# Patient Record
Sex: FEMALE | Race: OTHER | HISPANIC OR LATINO | Employment: UNEMPLOYED | URBAN - METROPOLITAN AREA
[De-identification: names, ages, dates, MRNs, and addresses within clinical notes are randomized per-mention and may not be internally consistent; named-entity substitution may affect disease eponyms.]

---

## 2020-10-07 ENCOUNTER — OFFICE VISIT (OUTPATIENT)
Dept: NEPHROLOGY | Facility: CLINIC | Age: 45
End: 2020-10-07

## 2020-10-07 VITALS
SYSTOLIC BLOOD PRESSURE: 142 MMHG | HEIGHT: 62 IN | BODY MASS INDEX: 20.21 KG/M2 | HEART RATE: 100 BPM | DIASTOLIC BLOOD PRESSURE: 90 MMHG | WEIGHT: 109.81 LBS

## 2020-10-07 DIAGNOSIS — I10 BENIGN ESSENTIAL HTN: ICD-10-CM

## 2020-10-07 DIAGNOSIS — N18.32 CHRONIC KIDNEY DISEASE (CKD) STAGE G3B/A3, MODERATELY DECREASED GLOMERULAR FILTRATION RATE (GFR) BETWEEN 30-44 ML/MIN/1.73 SQUARE METER AND ALBUMINURIA CREATININE RATIO GREATER THAN 300 MG/G: Primary | ICD-10-CM

## 2020-10-07 DIAGNOSIS — N04.9 NEPHROTIC SYNDROME: ICD-10-CM

## 2020-10-07 PROCEDURE — 99204 PR OFFICE/OUTPT VISIT, NEW, LEVL IV, 45-59 MIN: ICD-10-PCS | Mod: 25,S$PBB,, | Performed by: INTERNAL MEDICINE

## 2020-10-07 PROCEDURE — 76775 US EXAM ABDO BACK WALL LIM: CPT | Mod: PBBFAC | Performed by: INTERNAL MEDICINE

## 2020-10-07 PROCEDURE — 99203 OFFICE O/P NEW LOW 30 MIN: CPT | Mod: PBBFAC,25 | Performed by: INTERNAL MEDICINE

## 2020-10-07 PROCEDURE — 76775 PR  US, RETROPERITNL ABD,  LTD: ICD-10-PCS | Mod: 26,S$PBB,, | Performed by: INTERNAL MEDICINE

## 2020-10-07 PROCEDURE — 99999 PR PBB SHADOW E&M-NEW PATIENT-LVL III: ICD-10-PCS | Mod: PBBFAC,,, | Performed by: INTERNAL MEDICINE

## 2020-10-07 PROCEDURE — 99204 OFFICE O/P NEW MOD 45 MIN: CPT | Mod: 25,S$PBB,, | Performed by: INTERNAL MEDICINE

## 2020-10-07 PROCEDURE — 99999 PR PBB SHADOW E&M-NEW PATIENT-LVL III: CPT | Mod: PBBFAC,,, | Performed by: INTERNAL MEDICINE

## 2020-10-07 PROCEDURE — 76775 US EXAM ABDO BACK WALL LIM: CPT | Mod: 26,S$PBB,, | Performed by: INTERNAL MEDICINE

## 2020-10-07 RX ORDER — AMLODIPINE BESYLATE 10 MG/1
10 TABLET ORAL DAILY
COMMUNITY
End: 2021-05-27 | Stop reason: SDUPTHER

## 2020-10-07 RX ORDER — LOVASTATIN 40 MG/1
40 TABLET ORAL NIGHTLY
COMMUNITY
End: 2021-03-23 | Stop reason: SDUPTHER

## 2020-10-07 NOTE — H&P (VIEW-ONLY)
Subjective:       Patient ID: Luisa Baca is a 44 y.o. Other female who presents for new evaluation of Chronic Kidney Disease, Glomerulonephritis, Hypertension, and Proteinuria    HPI    Patient is a Lao-speaking female with history of hypertension for 5 years has been controlled on amlodipine only.  Has had history of proteinuria and worsening creatinine.  Now the creatinine is 1.45 with GFR-40%.  Patient also has history of elevated cholesterol along with foamy urine.  Possible nephrotic syndrome.  Has no insurance.  Has tried [facility] system.    October 2020 bedside ultrasound negative.  Proceed with a kidney biopsy.    Review of Systems   Constitutional: Negative for activity change, appetite change, chills, diaphoresis, fatigue, fever and unexpected weight change.   HENT: Negative for congestion, dental problem, drooling, postnasal drip, rhinorrhea and voice change.    Eyes: Negative for discharge.   Respiratory: Negative for apnea, cough, choking, chest tightness, shortness of breath, wheezing and stridor.    Cardiovascular: Negative for chest pain, palpitations and leg swelling.   Gastrointestinal: Negative for abdominal distention, blood in stool, constipation, diarrhea, nausea, rectal pain and vomiting.   Endocrine: Negative for cold intolerance, heat intolerance, polydipsia and polyuria.   Genitourinary: Negative for decreased urine volume, difficulty urinating, dysuria, enuresis, flank pain, frequency, hematuria and urgency.   Musculoskeletal: Negative for arthralgias, back pain, gait problem and joint swelling.   Skin: Negative for rash.   Allergic/Immunologic: Negative for food allergies and immunocompromised state.   Neurological: Negative for dizziness, tremors, syncope, numbness and headaches.   Hematological: Does not bruise/bleed easily.   Psychiatric/Behavioral: Negative for agitation, behavioral problems and self-injury. The patient is not nervous/anxious and is not hyperactive.    All other systems  "reviewed and are negative.      Objective:   BP (!) 142/90   Pulse 100   Ht 5' 2" (1.575 m)   Wt 49.8 kg (109 lb 12.6 oz)   BMI 20.08 kg/m²      Physical Exam  Constitutional:       Appearance: She is well-developed.   HENT:      Head: Normocephalic and atraumatic.   Eyes:      Conjunctiva/sclera: Conjunctivae normal.      Pupils: Pupils are equal, round, and reactive to light.   Neck:      Musculoskeletal: Full passive range of motion without pain, normal range of motion and neck supple. No edema.      Thyroid: No thyroid mass or thyromegaly.      Vascular: No carotid bruit.   Cardiovascular:      Rate and Rhythm: Normal rate and regular rhythm.      Chest Wall: PMI is not displaced.      Pulses: Normal pulses.      Heart sounds: Normal heart sounds, S1 normal and S2 normal. No murmur. No friction rub.   Pulmonary:      Effort: Pulmonary effort is normal. No accessory muscle usage or respiratory distress.      Breath sounds: Normal breath sounds. No wheezing or rales.   Chest:      Chest wall: No tenderness.   Abdominal:      General: Bowel sounds are normal. There is no distension.      Palpations: Abdomen is soft. There is no mass.      Tenderness: There is no abdominal tenderness. There is no rebound.      Hernia: No hernia is present.   Musculoskeletal: Normal range of motion.         General: No tenderness.   Skin:     General: Skin is warm and dry.      Coloration: Skin is not pale.      Findings: No bruising, ecchymosis, erythema or rash.      Nails: There is no clubbing.     Neurological:      Mental Status: She is alert and oriented to person, place, and time.      Cranial Nerves: No cranial nerve deficit.      Sensory: No sensory deficit.      Motor: No abnormal muscle tone.      Coordination: Coordination normal.      Deep Tendon Reflexes: Reflexes are normal and symmetric. Reflexes normal.   Psychiatric:         Speech: Speech normal.         Behavior: Behavior normal.         Thought Content: " Thought content normal.         Judgment: Judgment normal.                       The bilateral kidneys are small in size, echotexture, and cortical thickness. No evidence of hydronephrosis, solid mass, or calculus. No perinephric fluid collections.    The right kidney measures 9 cm in length.    The left kidney measures 8.7cm in length.       Impression: Normal renal ultrasound without evidence of hydronephrosis    Assessment:    )    1. Chronic kidney disease (CKD) stage G3b/A3, moderately decreased glomerular filtration rate (GFR) between 30-44 mL/min/1.73 square meter and albuminuria creatinine ratio greater than 300 mg/g    2. Nephrotic syndrome    3. Benign essential HTN        Plan:         proceed to diagnostic kidney biopsy

## 2020-10-07 NOTE — PROGRESS NOTES
Subjective:       Patient ID: Luisa Baca is a 44 y.o. Other female who presents for new evaluation of Chronic Kidney Disease, Glomerulonephritis, Hypertension, and Proteinuria    HPI    Patient is a Divehi-speaking female with history of hypertension for 5 years has been controlled on amlodipine only.  Has had history of proteinuria and worsening creatinine.  Now the creatinine is 1.45 with GFR-40%.  Patient also has history of elevated cholesterol along with foamy urine.  Possible nephrotic syndrome.  Has no insurance.  Has tried [facility].    October 2020 bedside ultrasound negative.  Proceed with a kidney biopsy.    Review of Systems   Constitutional: Negative for activity change, appetite change, chills, diaphoresis, fatigue, fever and unexpected weight change.   HENT: Negative for congestion, dental problem, drooling, postnasal drip, rhinorrhea and voice change.    Eyes: Negative for discharge.   Respiratory: Negative for apnea, cough, choking, chest tightness, shortness of breath, wheezing and stridor.    Cardiovascular: Negative for chest pain, palpitations and leg swelling.   Gastrointestinal: Negative for abdominal distention, blood in stool, constipation, diarrhea, nausea, rectal pain and vomiting.   Endocrine: Negative for cold intolerance, heat intolerance, polydipsia and polyuria.   Genitourinary: Negative for decreased urine volume, difficulty urinating, dysuria, enuresis, flank pain, frequency, hematuria and urgency.   Musculoskeletal: Negative for arthralgias, back pain, gait problem and joint swelling.   Skin: Negative for rash.   Allergic/Immunologic: Negative for food allergies and immunocompromised state.   Neurological: Negative for dizziness, tremors, syncope, numbness and headaches.   Hematological: Does not bruise/bleed easily.   Psychiatric/Behavioral: Negative for agitation, behavioral problems and self-injury. The patient is not nervous/anxious and is not hyperactive.    All other systems  "reviewed and are negative.      Objective:   BP (!) 142/90   Pulse 100   Ht 5' 2" (1.575 m)   Wt 49.8 kg (109 lb 12.6 oz)   BMI 20.08 kg/m²      Physical Exam  Constitutional:       Appearance: She is well-developed.   HENT:      Head: Normocephalic and atraumatic.   Eyes:      Conjunctiva/sclera: Conjunctivae normal.      Pupils: Pupils are equal, round, and reactive to light.   Neck:      Musculoskeletal: Full passive range of motion without pain, normal range of motion and neck supple. No edema.      Thyroid: No thyroid mass or thyromegaly.      Vascular: No carotid bruit.   Cardiovascular:      Rate and Rhythm: Normal rate and regular rhythm.      Chest Wall: PMI is not displaced.      Pulses: Normal pulses.      Heart sounds: Normal heart sounds, S1 normal and S2 normal. No murmur. No friction rub.   Pulmonary:      Effort: Pulmonary effort is normal. No accessory muscle usage or respiratory distress.      Breath sounds: Normal breath sounds. No wheezing or rales.   Chest:      Chest wall: No tenderness.   Abdominal:      General: Bowel sounds are normal. There is no distension.      Palpations: Abdomen is soft. There is no mass.      Tenderness: There is no abdominal tenderness. There is no rebound.      Hernia: No hernia is present.   Musculoskeletal: Normal range of motion.         General: No tenderness.   Skin:     General: Skin is warm and dry.      Coloration: Skin is not pale.      Findings: No bruising, ecchymosis, erythema or rash.      Nails: There is no clubbing.     Neurological:      Mental Status: She is alert and oriented to person, place, and time.      Cranial Nerves: No cranial nerve deficit.      Sensory: No sensory deficit.      Motor: No abnormal muscle tone.      Coordination: Coordination normal.      Deep Tendon Reflexes: Reflexes are normal and symmetric. Reflexes normal.   Psychiatric:         Speech: Speech normal.         Behavior: Behavior normal.         Thought Content: " Thought content normal.         Judgment: Judgment normal.                       The bilateral kidneys are small in size, echotexture, and cortical thickness. No evidence of hydronephrosis, solid mass, or calculus. No perinephric fluid collections.    The right kidney measures 9 cm in length.    The left kidney measures 8.7cm in length.       Impression: Normal renal ultrasound without evidence of hydronephrosis    Assessment:    )    1. Chronic kidney disease (CKD) stage G3b/A3, moderately decreased glomerular filtration rate (GFR) between 30-44 mL/min/1.73 square meter and albuminuria creatinine ratio greater than 300 mg/g    2. Nephrotic syndrome    3. Benign essential HTN        Plan:         proceed to diagnostic kidney biopsy

## 2020-10-21 DIAGNOSIS — R79.1 ABNORMAL BLOOD COAGULATION PROFILE: Primary | ICD-10-CM

## 2020-10-21 DIAGNOSIS — I10 ESSENTIAL HYPERTENSION: ICD-10-CM

## 2020-10-22 ENCOUNTER — TELEPHONE (OUTPATIENT)
Dept: RADIOLOGY | Facility: HOSPITAL | Age: 45
End: 2020-10-22

## 2020-10-23 ENCOUNTER — HOSPITAL ENCOUNTER (OUTPATIENT)
Dept: RADIOLOGY | Facility: HOSPITAL | Age: 45
Discharge: HOME | End: 2020-10-23
Attending: INTERNAL MEDICINE

## 2020-10-23 DIAGNOSIS — N04.9 NEPHROTIC SYNDROME: ICD-10-CM

## 2020-10-23 NOTE — PLAN OF CARE
Upon arrival pt states that she ate breakfast and her BP was 189/93. After consulting with Dr. Ford, pt was rescheduled to our earliest availability on 11/04. Pre-procedure instructions given to pt at this time and verbalized understanding of instructions and no questions at this time. Pt ambulated out with friend.

## 2020-10-29 ENCOUNTER — LAB VISIT (OUTPATIENT)
Dept: LAB | Facility: HOSPITAL | Age: 45
End: 2020-10-29
Attending: INTERNAL MEDICINE

## 2020-10-29 DIAGNOSIS — N18.32 CHRONIC KIDNEY DISEASE (CKD) STAGE G3B/A3, MODERATELY DECREASED GLOMERULAR FILTRATION RATE (GFR) BETWEEN 30-44 ML/MIN/1.73 SQUARE METER AND ALBUMINURIA CREATININE RATIO GREATER THAN 300 MG/G: ICD-10-CM

## 2020-10-29 DIAGNOSIS — I10 BENIGN ESSENTIAL HTN: ICD-10-CM

## 2020-10-29 DIAGNOSIS — N04.9 NEPHROTIC SYNDROME: ICD-10-CM

## 2020-10-29 LAB
ALBUMIN SERPL BCP-MCNC: 4.4 G/DL (ref 3.5–5.2)
ANION GAP SERPL CALC-SCNC: 9 MMOL/L (ref 8–16)
BASOPHILS # BLD AUTO: 0.05 K/UL (ref 0–0.2)
BASOPHILS NFR BLD: 0.9 % (ref 0–1.9)
BUN SERPL-MCNC: 50 MG/DL (ref 6–20)
CALCIUM SERPL-MCNC: 10.2 MG/DL (ref 8.7–10.5)
CHLORIDE SERPL-SCNC: 104 MMOL/L (ref 95–110)
CO2 SERPL-SCNC: 28 MMOL/L (ref 23–29)
CREAT SERPL-MCNC: 1.8 MG/DL (ref 0.5–1.4)
DIFFERENTIAL METHOD: NORMAL
EOSINOPHIL # BLD AUTO: 0.2 K/UL (ref 0–0.5)
EOSINOPHIL NFR BLD: 4.1 % (ref 0–8)
ERYTHROCYTE [DISTWIDTH] IN BLOOD BY AUTOMATED COUNT: 12.5 % (ref 11.5–14.5)
EST. GFR  (AFRICAN AMERICAN): 38 ML/MIN/1.73 M^2
EST. GFR  (NON AFRICAN AMERICAN): 33 ML/MIN/1.73 M^2
GLUCOSE SERPL-MCNC: 110 MG/DL (ref 70–110)
HCT VFR BLD AUTO: 42.7 % (ref 37–48.5)
HGB BLD-MCNC: 14.2 G/DL (ref 12–16)
IMM GRANULOCYTES # BLD AUTO: 0.02 K/UL (ref 0–0.04)
IMM GRANULOCYTES NFR BLD AUTO: 0.4 % (ref 0–0.5)
LYMPHOCYTES # BLD AUTO: 1.7 K/UL (ref 1–4.8)
LYMPHOCYTES NFR BLD: 30.1 % (ref 18–48)
MCH RBC QN AUTO: 28.7 PG (ref 27–31)
MCHC RBC AUTO-ENTMCNC: 33.3 G/DL (ref 32–36)
MCV RBC AUTO: 86 FL (ref 82–98)
MONOCYTES # BLD AUTO: 0.3 K/UL (ref 0.3–1)
MONOCYTES NFR BLD: 4.9 % (ref 4–15)
NEUTROPHILS # BLD AUTO: 3.3 K/UL (ref 1.8–7.7)
NEUTROPHILS NFR BLD: 59.6 % (ref 38–73)
NRBC BLD-RTO: 0 /100 WBC
PHOSPHATE SERPL-MCNC: 4.6 MG/DL (ref 2.7–4.5)
PLATELET # BLD AUTO: 231 K/UL (ref 150–350)
PMV BLD AUTO: 10.6 FL (ref 9.2–12.9)
POTASSIUM SERPL-SCNC: 4.4 MMOL/L (ref 3.5–5.1)
RBC # BLD AUTO: 4.94 M/UL (ref 4–5.4)
SODIUM SERPL-SCNC: 141 MMOL/L (ref 136–145)
WBC # BLD AUTO: 5.55 K/UL (ref 3.9–12.7)

## 2020-10-29 PROCEDURE — 36415 COLL VENOUS BLD VENIPUNCTURE: CPT

## 2020-10-29 PROCEDURE — 85025 COMPLETE CBC W/AUTO DIFF WBC: CPT

## 2020-10-29 PROCEDURE — 82610 CYSTATIN C: CPT

## 2020-10-29 PROCEDURE — 80069 RENAL FUNCTION PANEL: CPT

## 2020-11-02 LAB
CYSTATIN C SERPL-MCNC: 2.06 MG/L (ref 0.62–1.07)
GFR/BSA.PRED SERPLBLD CYS-BASED-ARV: 29 ML/MIN/BSA

## 2020-11-03 ENCOUNTER — TELEPHONE (OUTPATIENT)
Dept: RADIOLOGY | Facility: HOSPITAL | Age: 45
End: 2020-11-03

## 2020-11-03 NOTE — TELEPHONE ENCOUNTER
Interventional Radiology:  Called patient and VM was full.  Called 2nd number, spoke with her  - reminded him they need to be here at 0800 at hospital off [address] and [address] for 0800, nothing to eat/drink after midnight tonight, must have a ride home, take morning meds with a small sip of water.  He denies blood thinner use.

## 2020-11-04 ENCOUNTER — HOSPITAL ENCOUNTER (OUTPATIENT)
Dept: RADIOLOGY | Facility: HOSPITAL | Age: 45
Discharge: HOME | End: 2020-11-04
Attending: INTERNAL MEDICINE

## 2020-11-04 ENCOUNTER — HOSPITAL ENCOUNTER (EMERGENCY)
Facility: HOSPITAL | Age: 45
Discharge: HOME | End: 2020-11-04
Attending: EMERGENCY MEDICINE

## 2020-11-04 ENCOUNTER — TELEPHONE (OUTPATIENT)
Dept: NEPHROLOGY | Facility: CLINIC | Age: 45
End: 2020-11-04

## 2020-11-04 VITALS
DIASTOLIC BLOOD PRESSURE: 57 MMHG | OXYGEN SATURATION: 99 % | BODY MASS INDEX: 18.71 KG/M2 | TEMPERATURE: 98 F | HEART RATE: 52 BPM | HEIGHT: 63 IN | SYSTOLIC BLOOD PRESSURE: 97 MMHG | RESPIRATION RATE: 18 BRPM | WEIGHT: 105.63 LBS

## 2020-11-04 VITALS
BODY MASS INDEX: 19.49 KG/M2 | DIASTOLIC BLOOD PRESSURE: 57 MMHG | RESPIRATION RATE: 16 BRPM | HEART RATE: 56 BPM | OXYGEN SATURATION: 99 % | WEIGHT: 110 LBS | HEIGHT: 63 IN | SYSTOLIC BLOOD PRESSURE: 114 MMHG

## 2020-11-04 DIAGNOSIS — G89.18 ACUTE POST-OPERATIVE PAIN: Primary | ICD-10-CM

## 2020-11-04 LAB
ALBUMIN SERPL BCP-MCNC: 4 G/DL (ref 3.5–5.2)
ALP SERPL-CCNC: 75 U/L (ref 55–135)
ALT SERPL W/O P-5'-P-CCNC: 14 U/L (ref 10–44)
ANION GAP SERPL CALC-SCNC: 7 MMOL/L (ref 8–16)
AST SERPL-CCNC: 19 U/L (ref 10–40)
BACTERIA #/AREA URNS HPF: ABNORMAL /HPF
BASOPHILS # BLD AUTO: 0.03 K/UL (ref 0–0.2)
BASOPHILS NFR BLD: 0.3 % (ref 0–1.9)
BILIRUB SERPL-MCNC: 0.5 MG/DL (ref 0.1–1)
BILIRUB UR QL STRIP: NEGATIVE
BUN SERPL-MCNC: 41 MG/DL (ref 6–20)
CALCIUM SERPL-MCNC: 9.7 MG/DL (ref 8.7–10.5)
CHLORIDE SERPL-SCNC: 105 MMOL/L (ref 95–110)
CLARITY UR: CLEAR
CO2 SERPL-SCNC: 30 MMOL/L (ref 23–29)
COLOR UR: YELLOW
CREAT SERPL-MCNC: 2.1 MG/DL (ref 0.5–1.4)
DIFFERENTIAL METHOD: ABNORMAL
EOSINOPHIL # BLD AUTO: 0.1 K/UL (ref 0–0.5)
EOSINOPHIL NFR BLD: 0.9 % (ref 0–8)
ERYTHROCYTE [DISTWIDTH] IN BLOOD BY AUTOMATED COUNT: 12.1 % (ref 11.5–14.5)
EST. GFR  (AFRICAN AMERICAN): 32 ML/MIN/1.73 M^2
EST. GFR  (NON AFRICAN AMERICAN): 27 ML/MIN/1.73 M^2
GLUCOSE SERPL-MCNC: 192 MG/DL (ref 70–110)
GLUCOSE UR QL STRIP: NEGATIVE
HCT VFR BLD AUTO: 38.5 % (ref 37–48.5)
HGB BLD-MCNC: 13.2 G/DL (ref 12–16)
HGB UR QL STRIP: ABNORMAL
HYALINE CASTS #/AREA URNS LPF: 2 /LPF
IMM GRANULOCYTES # BLD AUTO: 0.07 K/UL (ref 0–0.04)
IMM GRANULOCYTES NFR BLD AUTO: 0.7 % (ref 0–0.5)
KETONES UR QL STRIP: NEGATIVE
LEUKOCYTE ESTERASE UR QL STRIP: NEGATIVE
LIPASE SERPL-CCNC: 51 U/L (ref 4–60)
LYMPHOCYTES # BLD AUTO: 1.5 K/UL (ref 1–4.8)
LYMPHOCYTES NFR BLD: 14.3 % (ref 18–48)
MCH RBC QN AUTO: 29.3 PG (ref 27–31)
MCHC RBC AUTO-ENTMCNC: 34.3 G/DL (ref 32–36)
MCV RBC AUTO: 86 FL (ref 82–98)
MICROSCOPIC COMMENT: ABNORMAL
MONOCYTES # BLD AUTO: 0.6 K/UL (ref 0.3–1)
MONOCYTES NFR BLD: 5.8 % (ref 4–15)
NEUTROPHILS # BLD AUTO: 8.2 K/UL (ref 1.8–7.7)
NEUTROPHILS NFR BLD: 78 % (ref 38–73)
NITRITE UR QL STRIP: NEGATIVE
NRBC BLD-RTO: 0 /100 WBC
PH UR STRIP: 6 [PH] (ref 5–8)
PLATELET # BLD AUTO: 248 K/UL (ref 150–350)
PMV BLD AUTO: 10.3 FL (ref 9.2–12.9)
POTASSIUM SERPL-SCNC: 4.2 MMOL/L (ref 3.5–5.1)
PROT SERPL-MCNC: 7 G/DL (ref 6–8.4)
PROT UR QL STRIP: ABNORMAL
RBC # BLD AUTO: 4.5 M/UL (ref 4–5.4)
RBC #/AREA URNS HPF: 5 /HPF (ref 0–4)
SODIUM SERPL-SCNC: 142 MMOL/L (ref 136–145)
SP GR UR STRIP: 1.02 (ref 1–1.03)
URN SPEC COLLECT METH UR: ABNORMAL
UROBILINOGEN UR STRIP-ACNC: NEGATIVE EU/DL
WBC # BLD AUTO: 10.54 K/UL (ref 3.9–12.7)
WBC #/AREA URNS HPF: 1 /HPF (ref 0–5)

## 2020-11-04 PROCEDURE — 99284 EMERGENCY DEPT VISIT MOD MDM: CPT | Mod: 25

## 2020-11-04 PROCEDURE — 80053 COMPREHEN METABOLIC PANEL: CPT

## 2020-11-04 PROCEDURE — 81000 URINALYSIS NONAUTO W/SCOPE: CPT

## 2020-11-04 PROCEDURE — 96375 TX/PRO/DX INJ NEW DRUG ADDON: CPT

## 2020-11-04 PROCEDURE — 96374 THER/PROPH/DIAG INJ IV PUSH: CPT

## 2020-11-04 PROCEDURE — 63600175 PHARM REV CODE 636 W HCPCS: Performed by: PHYSICIAN ASSISTANT

## 2020-11-04 PROCEDURE — 63600175 PHARM REV CODE 636 W HCPCS: Performed by: EMERGENCY MEDICINE

## 2020-11-04 PROCEDURE — 77012 CT SCAN FOR NEEDLE BIOPSY: CPT | Mod: TC

## 2020-11-04 PROCEDURE — 83690 ASSAY OF LIPASE: CPT

## 2020-11-04 PROCEDURE — 85025 COMPLETE CBC W/AUTO DIFF WBC: CPT

## 2020-11-04 RX ORDER — MORPHINE SULFATE 4 MG/ML
4 INJECTION, SOLUTION INTRAMUSCULAR; INTRAVENOUS
Status: COMPLETED | OUTPATIENT
Start: 2020-11-04 | End: 2020-11-04

## 2020-11-04 RX ORDER — ONDANSETRON 2 MG/ML
4 INJECTION INTRAMUSCULAR; INTRAVENOUS
Status: COMPLETED | OUTPATIENT
Start: 2020-11-04 | End: 2020-11-04

## 2020-11-04 RX ORDER — MIDAZOLAM HYDROCHLORIDE 1 MG/ML
INJECTION INTRAMUSCULAR; INTRAVENOUS CODE/TRAUMA/SEDATION MEDICATION
Status: DISCONTINUED | OUTPATIENT
Start: 2020-11-04 | End: 2020-11-05 | Stop reason: HOSPADM

## 2020-11-04 RX ORDER — FENTANYL CITRATE 50 UG/ML
INJECTION, SOLUTION INTRAMUSCULAR; INTRAVENOUS CODE/TRAUMA/SEDATION MEDICATION
Status: DISCONTINUED | OUTPATIENT
Start: 2020-11-04 | End: 2020-11-05 | Stop reason: HOSPADM

## 2020-11-04 RX ORDER — TRAMADOL HYDROCHLORIDE 50 MG/1
50 TABLET ORAL EVERY 8 HOURS PRN
Qty: 12 TABLET | Refills: 0 | Status: SHIPPED | OUTPATIENT
Start: 2020-11-04 | End: 2020-11-13

## 2020-11-04 RX ADMIN — MORPHINE SULFATE 4 MG: 4 INJECTION, SOLUTION INTRAMUSCULAR; INTRAVENOUS at 05:11

## 2020-11-04 RX ADMIN — FENTANYL CITRATE 25 MCG: 50 INJECTION, SOLUTION INTRAMUSCULAR; INTRAVENOUS at 10:11

## 2020-11-04 RX ADMIN — MIDAZOLAM HYDROCHLORIDE 0.5 MG: 1 INJECTION, SOLUTION INTRAMUSCULAR; INTRAVENOUS at 10:11

## 2020-11-04 RX ADMIN — ONDANSETRON 4 MG: 2 INJECTION INTRAMUSCULAR; INTRAVENOUS at 05:11

## 2020-11-04 NOTE — ED PROVIDER NOTES
SCRIBE #1 NOTE: I, Laurence Wilson, am scribing for, and in the presence of, Osvaldo Wells Jr., MD. I have scribed the entire note.      History      Chief Complaint   Patient presents with    Flank Pain     Pt c/o of left flank pain and N//V after left kidney biopsy today.        Review of patient's allergies indicates:  No Known Allergies     HPI   HPI    11/4/2020, 5:04 PM   History obtained from the daughter and patient      History of Present Illness: Luisa Aguilar is a 45 y.o. female patient, with no known medical history, status 2 hours post left kidney biopsy, who presents to the Emergency Department for an evaluation of left flank pain which onset gradually today approximately 1.5 hours after completing a CT-guided left kidney biopsy . Symptoms are constant and moderate in severity. No mitigating or exacerbating factors reported. Associated sxs include an achy left lower quadrant abdominal pain. Patient denies any fever, chills, dysuria, hematuria, urinary frequency, n/v, and all other sxs at this time. No prior treatment reported. No further complaints or concerns at this time.         Arrival mode: Personal vehicle    PCP: Konstantin Martines MD       Past Medical History:  History reviewed. No pertinent family history.       Past Surgical History:  History reviewed. No pertinent past surgical history.         Family History:  History reviewed. No pertinent family history.       Social History:  Social History     Tobacco Use    Smoking status: Never Smoker   Substance and Sexual Activity    Alcohol use: Never     Frequency: Never    Drug use: Not on file    Sexual activity: Unknown       ROS   Review of Systems   Constitutional: Negative for chills and fever.   HENT: Negative for sore throat.    Respiratory: Negative for shortness of breath.    Cardiovascular: Negative for chest pain.   Gastrointestinal: Positive for abdominal pain (LLQ). Negative for nausea and vomiting.    Genitourinary: Positive for flank pain (left ). Negative for dysuria, frequency and hematuria.   Musculoskeletal: Negative for back pain.   Skin: Negative for rash.   Neurological: Negative for weakness.   Hematological: Does not bruise/bleed easily.   All other systems reviewed and are negative.      Physical Exam      Initial Vitals [11/04/20 1628]   BP Pulse Resp Temp SpO2   (!) 103/55 (!) 57 16 97.9 °F (36.6 °C) 98 %      MAP       --          Physical Exam  Nursing Notes and Vital Signs Reviewed.  Constitutional: Patient is in mild distress. Well-developed and well-nourished.  Head: Atraumatic. Normocephalic.  Eyes: EOM intact.  No scleral icterus.  ENT: Mucous membranes are moist.  Nares clear  Neck:. Full ROM.  Trachea midline  Cardiovascular: Regular rate. Regular rhythm. No murmurs, rubs, or gallops. Distal pulses are 2+ and symmetric.  Pulmonary/Chest: No respiratory distress. Clear to auscultation bilaterally. No wheezing or rales.  Abdominal: Soft and non-distended.  There is no tenderness.  No rebound, guarding, or rigidity. Good bowel sounds.  Genitourinary:  Mild left CVA tenderness.  No guarding or rebound.  No suprapubic tenderness.  Musculoskeletal: Moves all extremities. No obvious deformities. No edema. No calf tenderness.  Skin: There is a puncture wound to the left back that is healing consistent with her history today a CT-guided biopsy.. There is no redness, no swelling, no drainage. It is consistent with history.  No fluctuance or abscess.  Neurological:  Alert, awake, and appropriate.  Normal speech.  No acute focal neurological deficits are appreciated.  Two through 12 intact bilaterally.  Psychiatric: Normal affect. Good eye contact. Appropriate in content.    ED Course    Procedures  ED Vital Signs:  Vitals:    11/04/20 1628 11/04/20 1716 11/04/20 1725 11/04/20 1730   BP: (!) 103/55  127/63 (!) 106/56   Pulse: (!) 57 (!) 57 63 (!) 57   Resp: 16  19 17   Temp: 97.9 °F (36.6 °C)     "  TempSrc: Oral      SpO2: 98%  99% 100%   Weight: 47.9 kg (105 lb 9.6 oz)      Height: 5' 3" (1.6 m)       11/04/20 1735 11/04/20 1830   BP:  (!) 105/58   Pulse:  (!) 49   Resp: 16 17   Temp:     TempSrc:     SpO2:  100%   Weight:     Height:         Abnormal Lab Results:  Labs Reviewed   CBC W/ AUTO DIFFERENTIAL - Abnormal; Notable for the following components:       Result Value    Immature Granulocytes 0.7 (*)     Gran # (ANC) 8.2 (*)     Immature Grans (Abs) 0.07 (*)     Gran % 78.0 (*)     Lymph % 14.3 (*)     All other components within normal limits   COMPREHENSIVE METABOLIC PANEL - Abnormal; Notable for the following components:    CO2 30 (*)     Glucose 192 (*)     BUN 41 (*)     Creatinine 2.1 (*)     Anion Gap 7 (*)     eGFR if  32 (*)     eGFR if non  27 (*)     All other components within normal limits   URINALYSIS - Abnormal; Notable for the following components:    Protein, UA 2+ (*)     Occult Blood UA 2+ (*)     All other components within normal limits   URINALYSIS MICROSCOPIC - Abnormal; Notable for the following components:    RBC, UA 5 (*)     Hyaline Casts, UA 2 (*)     All other components within normal limits   LIPASE        All Lab Results:   Results for orders placed or performed during the hospital encounter of 11/04/20   CBC auto differential   Result Value Ref Range    WBC 10.54 3.90 - 12.70 K/uL    RBC 4.50 4.00 - 5.40 M/uL    Hemoglobin 13.2 12.0 - 16.0 g/dL    Hematocrit 38.5 37.0 - 48.5 %    MCV 86 82 - 98 fL    MCH 29.3 27.0 - 31.0 pg    MCHC 34.3 32.0 - 36.0 g/dL    RDW 12.1 11.5 - 14.5 %    Platelets 248 150 - 350 K/uL    MPV 10.3 9.2 - 12.9 fL    Immature Granulocytes 0.7 (H) 0.0 - 0.5 %    Gran # (ANC) 8.2 (H) 1.8 - 7.7 K/uL    Immature Grans (Abs) 0.07 (H) 0.00 - 0.04 K/uL    Lymph # 1.5 1.0 - 4.8 K/uL    Mono # 0.6 0.3 - 1.0 K/uL    Eos # 0.1 0.0 - 0.5 K/uL    Baso # 0.03 0.00 - 0.20 K/uL    nRBC 0 0 /100 WBC    Gran % 78.0 (H) 38.0 - 73.0 %    " Lymph % 14.3 (L) 18.0 - 48.0 %    Mono % 5.8 4.0 - 15.0 %    Eosinophil % 0.9 0.0 - 8.0 %    Basophil % 0.3 0.0 - 1.9 %    Differential Method Automated    Comprehensive metabolic panel   Result Value Ref Range    Sodium 142 136 - 145 mmol/L    Potassium 4.2 3.5 - 5.1 mmol/L    Chloride 105 95 - 110 mmol/L    CO2 30 (H) 23 - 29 mmol/L    Glucose 192 (H) 70 - 110 mg/dL    BUN 41 (H) 6 - 20 mg/dL    Creatinine 2.1 (H) 0.5 - 1.4 mg/dL    Calcium 9.7 8.7 - 10.5 mg/dL    Total Protein 7.0 6.0 - 8.4 g/dL    Albumin 4.0 3.5 - 5.2 g/dL    Total Bilirubin 0.5 0.1 - 1.0 mg/dL    Alkaline Phosphatase 75 55 - 135 U/L    AST 19 10 - 40 U/L    ALT 14 10 - 44 U/L    Anion Gap 7 (L) 8 - 16 mmol/L    eGFR if African American 32 (A) >60 mL/min/1.73 m^2    eGFR if non African American 27 (A) >60 mL/min/1.73 m^2   Lipase   Result Value Ref Range    Lipase 51 4 - 60 U/L   Urinalysis   Result Value Ref Range    Specimen UA Urine, Clean Catch     Color, UA Yellow Yellow, Straw, Natalia    Appearance, UA Clear Clear    pH, UA 6.0 5.0 - 8.0    Specific Gravity, UA 1.020 1.005 - 1.030    Protein, UA 2+ (A) Negative    Glucose, UA Negative Negative    Ketones, UA Negative Negative    Bilirubin (UA) Negative Negative    Occult Blood UA 2+ (A) Negative    Nitrite, UA Negative Negative    Urobilinogen, UA Negative <2.0 EU/dL    Leukocytes, UA Negative Negative   Urinalysis Microscopic   Result Value Ref Range    RBC, UA 5 (H) 0 - 4 /hpf    WBC, UA 1 0 - 5 /hpf    Bacteria Rare None-Occ /hpf    Hyaline Casts, UA 2 (A) 0-1/lpf /lpf    Microscopic Comment SEE COMMENT        Imaging Results:  Imaging Results    None                 The Emergency Provider reviewed the vital signs and test results, which are outlined above.    ED Discussion     7:41 PM: Reassessed pt at this time.  Pt states her condition has improved at this time. Discussed with pt all pertinent ED information and results. Discussed pt dx and plan of tx. Gave pt all f/u and return to  the ED instructions. All questions and concerns were addressed at this time. Pt expresses understanding of information and instructions, and is comfortable with plan to discharge. Pt is stable for discharge.    I discussed with patient and/or family/caretaker that evaluation in the ED does not suggest any emergent or life threatening medical conditions requiring immediate intervention beyond what was provided in the ED, and I believe patient is safe for discharge.  Regardless, an unremarkable evaluation in the ED does not preclude the development or presence of a serious of life threatening condition. As such, patient was instructed to return immediately for any worsening or change in current symptoms.    7:45 PM  Patient is stable nontoxic.  Symptoms appear to be related to postoperative pain from her biopsy.  There is no sign of infection the patient is stable nontoxic.  She is safe for discharge in my opinion.  Discussed with her all findings well as the plan of care.  She verbalized agreement understanding with all seems reliable.  She does have chronic renal insufficiency which was why she had a biopsy today.       ED Medication(s):  Medications   ondansetron injection 4 mg (4 mg Intravenous Given 11/4/20 1735)   morphine injection 4 mg (4 mg Intravenous Given 11/4/20 1735)     New Prescriptions    TRAMADOL (ULTRAM) 50 MG TABLET    Take 1 tablet (50 mg total) by mouth every 8 (eight) hours as needed.       Follow-up Information     Konstantin Martines MD.    Specialty: Family Medicine  Contact information:  [address] [facility] [id number] [phone]                     Medical Decision Making    Medical Decision Making:   Clinical Tests:   Lab Tests: Ordered and Reviewed           Scribe Attestation:   Scribe #1: I performed the above scribed service and the documentation accurately describes the services I performed. I attest to the accuracy of the note.    Attending:   Physician Attestation Statement  for Scribe #1: I, Osvaldo Wells Jr., MD, personally performed the services described in this documentation, as scribed by Laurence Wilson, in my presence, and it is both accurate and complete.          Clinical Impression       ICD-10-CM ICD-9-CM   1. Acute post-operative pain  G89.18 338.18       Disposition:   Disposition: Discharged  Condition: Stable         Osvaldo Wells Jr., MD  11/04/20 1946

## 2020-11-04 NOTE — PLAN OF CARE
Band aid to left lateral flank puncture site C/D/I with no bleeding/redness/swelling noted. VSS, NADN, and pt meets criteria for discharge. Discharge instructions given to and reviewed with pt and pt's daughter and both verbalized understanding of all. Pt discharged to home, taken out via wheelchair and driven home by daughter.

## 2020-11-04 NOTE — DISCHARGE SUMMARY
Pre Op Diagnosis: Nephrotic syndrome     Post Op Diagnosis: same     Procedure:  Renal core biopsy     Procedure performed by: Liliana ALMARAZ, Prateek COX     Written Informed Consent Obtained: Yes     Specimen Removed:  yes     Estimated Blood Loss:  minimal     Findings: Local anesthesia and moderate sedation were used.     The patient tolerated the procedure well and there were no complications.      Sterile technique was performed in the left flank, lidocaine was used as a local anesthetic.  Multiple samples taken from the left renal cortex.  Pt tolerated the procedure well without immediate complications.  Please see radiologist report for details. F/u with PCP and/or ordering physician.

## 2020-11-04 NOTE — SEDATION DOCUMENTATION
Pt placed prone with arms above her head on CT table at this time. CM in place VSS, NADN, and pt verbalizes understanding of procedure.

## 2020-11-04 NOTE — TELEPHONE ENCOUNTER
----- Message from Janice Lindsey sent at 11/4/2020  3:48 PM CST -----  Contact: Ancelmo-spouse  Ancelmo requesting a call back regardin gpt. He states that the pt is in a lot of pain after having biopsy. Please call Ancelmo back at [phone]

## 2020-11-04 NOTE — SEDATION DOCUMENTATION
Procedure completed at this time. VSS, NADN, and pt tolerated procedure well. Band aid placed to left lateral flank area puncture site C/D/I with no bleeding noted. Will continue to monitor pt.

## 2020-11-04 NOTE — DISCHARGE INSTRUCTIONS
Recuperación después de la sedación para procedimientos (adultos)  Le administraron medicamentos por vía intravenosa para sedarlo karla la cirugía. Es posible que le hayan dado un medicamento contra el dolor y otro para dormir. La mayor parte del efecto de estos medicamentos ya ha desaparecido. Marcos puede sentir somnolencia karla las próximas seis a ocho horas.   Cuidados en el hogar  Cuando llegue a doe casa, siga las indicaciones detalladas a continuación:  · Es importante que haya un adulto responsable a doe lado karla las próximas ocho horas para vigilar que doe estado no empeore.  · No christoph alcohol karla las próximas 24 horas.  · No conduzca, no opere maquinaria peligrosa ni tome decisiones importantes personales o de negocios karla las siguientes 24 horas.  · Para evitar lesiones o caídas, y karla al menos 24 horas después del procedimiento, tenga cuidado al ponerse de pie y caminar.  Nota: Doe proveedor de atención médica puede indicarle que no tome ningún medicamento por boca para el dolor o para dormir karla las próximas cuatro horas. Dichos medicamentos pueden reaccionar con los medicamentos que le administraron en el hospital. Podrían causar karen respuesta mucho más suha que lo normal.   Seguimiento  Programe karen visita de seguimiento con doe proveedor de atención médica si no se siente alerta ni puede reanudar doe nivel normal de actividad en las siguientes 12 horas.   Cuándo buscar atención médica  Llame a doe proveedor de atención médica de inmediato si tiene cualquiera de los siguientes síntomas:   · somnolencia que empeora;  · debilidad o mareos que empeoran;  · vómitos persistentes;  · imposibilidad para despertarse;  · fiebre;  · erupción cutánea nueva.  © 2670-0483 [employer]. [address] [place] [address], [place] [address]. Todos los derechos reservados. Esta información no pretende sustituir la atención médica profesional. Sólo doe médico puede diagnosticar y tratar un problema  de cody.           Instrucciones de maria eugenia para la biopsia renal  A usted se le practicó karen biopsia renal. Doe médico empleó karen aguja especial para kianna karen pequeña muestra de tejido del riñón a fin de examinarla y determinar si existen síntomas de daño y enfermedad. Se solicita karen biopsia renal después de que otras pruebas hayan mostrado la posibilidad de algún problema con el riñón. Las biopsias renales se practican cuando se sospecha karen enfermedad renal y para descartar la posibilidad de cáncer.  Cuidados en la casa  · Descanse karla 24 a 48 horas. Levántese solo para ir al baño.  · No conduzca un automóvil karla por 24 a 48 horas después del procedimiento.  · No se duche karla 24 horas después de la biopsia. Si lo desea, puede lavarse con esponja o toallita. Cuando pueda ducharse, no se frote el sitio de la biopsia. Lávese cuidadosamente el área y séquela con palmaditas suaves.  · Quite el vendaje que cubre el sitio de la biopsia entre 24 y 48 horas después del procedimiento.  · No levante nada que pese más de 10 libras karla 3 o 4 días después del procedimiento.  · Pregúntele a doe médico cuándo puede volver a trabajar. Deberá informar a doe médico si doe trabajo implica levantar cosas pesadas.  · Si normalmente aayush medicamentos diluyentes de la juan antonio (anticoagulantes o antiagregantes plaquetarios) y dejó de tomarlos unos días antes de doe procedimiento, pregúntele a doe médico cuándo debe comenzar a tomarlos otra vez.        Cuándo debe llamar a doe médico  Llame a doe médico de inmediato si nota que tiene cualquiera de estos síntomas:  · Juan Antonio en la orina  · Agotamiento o mucha debilidad  · Mareo o aturdimiento  · Dificultad repentina (o en aumento) para respirar  · Dolor repentino en el pecho  · Fiebre de 100.4 °F (38  °C) o más maria eugenia, o escalofríos  · Aumento del enrojecimiento, sensibilidad al tacto o hinchazón en el sitio de la biopsia  · Abertura o supuración en el sitio de la biopsia  · Aumento  del dolor (con o sin actividad)   Date Last Reviewed: 1/6/2015  © 5884-6313 [employer]. [address] [place] [address], [place] [address]. Todos los derechos reservados. Esta información no pretende sustituir la atención médica profesional. Sólo doe médico puede diagnosticar y tratar un problema de cody.

## 2020-11-05 ENCOUNTER — HOSPITAL ENCOUNTER (EMERGENCY)
Facility: HOSPITAL | Age: 45
Discharge: HOME | End: 2020-11-05
Attending: EMERGENCY MEDICINE

## 2020-11-05 VITALS
HEART RATE: 68 BPM | SYSTOLIC BLOOD PRESSURE: 131 MMHG | DIASTOLIC BLOOD PRESSURE: 66 MMHG | OXYGEN SATURATION: 100 % | BODY MASS INDEX: 18.43 KG/M2 | TEMPERATURE: 99 F | HEIGHT: 63 IN | WEIGHT: 104 LBS | RESPIRATION RATE: 19 BRPM

## 2020-11-05 DIAGNOSIS — R11.10 VOMITING: Primary | ICD-10-CM

## 2020-11-05 DIAGNOSIS — S37.012A HEMATOMA OF LEFT KIDNEY, INITIAL ENCOUNTER: ICD-10-CM

## 2020-11-05 LAB
ALBUMIN SERPL BCP-MCNC: 4.2 G/DL (ref 3.5–5.2)
ALP SERPL-CCNC: 80 U/L (ref 55–135)
ALT SERPL W/O P-5'-P-CCNC: 15 U/L (ref 10–44)
ANION GAP SERPL CALC-SCNC: 11 MMOL/L (ref 8–16)
AST SERPL-CCNC: 19 U/L (ref 10–40)
BACTERIA #/AREA URNS HPF: ABNORMAL /HPF
BASOPHILS # BLD AUTO: 0.03 K/UL (ref 0–0.2)
BASOPHILS NFR BLD: 0.3 % (ref 0–1.9)
BILIRUB SERPL-MCNC: 0.7 MG/DL (ref 0.1–1)
BILIRUB UR QL STRIP: NEGATIVE
BUN SERPL-MCNC: 41 MG/DL (ref 6–20)
CALCIUM SERPL-MCNC: 10.1 MG/DL (ref 8.7–10.5)
CHLORIDE SERPL-SCNC: 99 MMOL/L (ref 95–110)
CLARITY UR: CLEAR
CO2 SERPL-SCNC: 25 MMOL/L (ref 23–29)
COLOR UR: YELLOW
CREAT SERPL-MCNC: 2.6 MG/DL (ref 0.5–1.4)
DIFFERENTIAL METHOD: ABNORMAL
EOSINOPHIL # BLD AUTO: 0 K/UL (ref 0–0.5)
EOSINOPHIL NFR BLD: 0.1 % (ref 0–8)
ERYTHROCYTE [DISTWIDTH] IN BLOOD BY AUTOMATED COUNT: 12 % (ref 11.5–14.5)
EST. GFR  (AFRICAN AMERICAN): 24 ML/MIN/1.73 M^2
EST. GFR  (NON AFRICAN AMERICAN): 21 ML/MIN/1.73 M^2
GLUCOSE SERPL-MCNC: 123 MG/DL (ref 70–110)
GLUCOSE UR QL STRIP: NEGATIVE
HCT VFR BLD AUTO: 37 % (ref 37–48.5)
HGB BLD-MCNC: 12.5 G/DL (ref 12–16)
HGB UR QL STRIP: ABNORMAL
HYALINE CASTS #/AREA URNS LPF: 2 /LPF
IMM GRANULOCYTES # BLD AUTO: 0.05 K/UL (ref 0–0.04)
IMM GRANULOCYTES NFR BLD AUTO: 0.5 % (ref 0–0.5)
KETONES UR QL STRIP: NEGATIVE
LEUKOCYTE ESTERASE UR QL STRIP: ABNORMAL
LIPASE SERPL-CCNC: 22 U/L (ref 4–60)
LYMPHOCYTES # BLD AUTO: 0.8 K/UL (ref 1–4.8)
LYMPHOCYTES NFR BLD: 7.5 % (ref 18–48)
MCH RBC QN AUTO: 28.8 PG (ref 27–31)
MCHC RBC AUTO-ENTMCNC: 33.8 G/DL (ref 32–36)
MCV RBC AUTO: 85 FL (ref 82–98)
MICROSCOPIC COMMENT: ABNORMAL
MONOCYTES # BLD AUTO: 0.3 K/UL (ref 0.3–1)
MONOCYTES NFR BLD: 3.1 % (ref 4–15)
NEUTROPHILS # BLD AUTO: 9.8 K/UL (ref 1.8–7.7)
NEUTROPHILS NFR BLD: 88.5 % (ref 38–73)
NITRITE UR QL STRIP: NEGATIVE
NRBC BLD-RTO: 0 /100 WBC
PH UR STRIP: 6 [PH] (ref 5–8)
PLATELET # BLD AUTO: 218 K/UL (ref 150–350)
PMV BLD AUTO: 9.9 FL (ref 9.2–12.9)
POTASSIUM SERPL-SCNC: 4.5 MMOL/L (ref 3.5–5.1)
PROT SERPL-MCNC: 7.5 G/DL (ref 6–8.4)
PROT UR QL STRIP: ABNORMAL
RBC # BLD AUTO: 4.34 M/UL (ref 4–5.4)
RBC #/AREA URNS HPF: 5 /HPF (ref 0–4)
SODIUM SERPL-SCNC: 135 MMOL/L (ref 136–145)
SP GR UR STRIP: 1.02 (ref 1–1.03)
SQUAMOUS #/AREA URNS HPF: 2 /HPF
URN SPEC COLLECT METH UR: ABNORMAL
UROBILINOGEN UR STRIP-ACNC: NEGATIVE EU/DL
WBC # BLD AUTO: 11 K/UL (ref 3.9–12.7)
WBC #/AREA URNS HPF: 2 /HPF (ref 0–5)

## 2020-11-05 PROCEDURE — 81000 URINALYSIS NONAUTO W/SCOPE: CPT

## 2020-11-05 PROCEDURE — 99284 EMERGENCY DEPT VISIT MOD MDM: CPT | Mod: 25

## 2020-11-05 PROCEDURE — 83690 ASSAY OF LIPASE: CPT

## 2020-11-05 PROCEDURE — 96361 HYDRATE IV INFUSION ADD-ON: CPT

## 2020-11-05 PROCEDURE — 85025 COMPLETE CBC W/AUTO DIFF WBC: CPT

## 2020-11-05 PROCEDURE — 36415 COLL VENOUS BLD VENIPUNCTURE: CPT

## 2020-11-05 PROCEDURE — 63600175 PHARM REV CODE 636 W HCPCS: Performed by: EMERGENCY MEDICINE

## 2020-11-05 PROCEDURE — 80053 COMPREHEN METABOLIC PANEL: CPT

## 2020-11-05 PROCEDURE — 25000003 PHARM REV CODE 250: Performed by: EMERGENCY MEDICINE

## 2020-11-05 PROCEDURE — 96365 THER/PROPH/DIAG IV INF INIT: CPT

## 2020-11-05 RX ORDER — ONDANSETRON 4 MG/1
4 TABLET, FILM COATED ORAL EVERY 8 HOURS PRN
Qty: 12 TABLET | Refills: 0 | Status: SHIPPED | OUTPATIENT
Start: 2020-11-05 | End: 2021-09-08

## 2020-11-05 RX ADMIN — SODIUM CHLORIDE 1000 ML: 0.9 INJECTION, SOLUTION INTRAVENOUS at 07:11

## 2020-11-05 RX ADMIN — PROMETHAZINE HYDROCHLORIDE 25 MG: 25 INJECTION INTRAMUSCULAR; INTRAVENOUS at 07:11

## 2020-11-06 ENCOUNTER — TELEPHONE (OUTPATIENT)
Dept: RADIOLOGY | Facility: HOSPITAL | Age: 45
End: 2020-11-06

## 2020-11-06 NOTE — TELEPHONE ENCOUNTER
Interventional Radiology:  Called patient and spoke with , she is not having pain, no N/V, night light headedness when standing and denies fast heart rate.  He states she feels much better.  I gave him our direct line to call if any of the symptoms develop or come back.

## 2020-11-06 NOTE — PROGRESS NOTES
Kidney Biopsy Preliminary Report:     D/w Dr. Kaur :      Small Sample:     IgA Nephropathy     10 % fibrosis     Mesangial expansion       Pollo Dennison MD

## 2020-11-06 NOTE — ED PROVIDER NOTES
SCRIBE #1 NOTE: I, Diane Fraire, am scribing for, and in the presence of, Carlie Schultz MD. I have scribed the HPI, ROS, and PEx.     SCRIBE #2 NOTE: I, Fartun Everett, am scribing for, and in the presence of,  Wilber Noriega MD. I have scribed the remaining portions of the note not scribed by Scribe #1.      History     Chief Complaint   Patient presents with    Vomiting     Vomiting - s/o yesterday s/p biopsy of L kidney. Was seen at this ED for same yesterday. Vomiting has continued and worsened today.     Review of patient's allergies indicates:  No Known Allergies      History of Present Illness     HPI    11/5/2020, 7:29 PM  History obtained through the patient's daughter for the patient as pt does not speak English.      History of Present Illness: Luisa Aguilar is a 45 y.o. female patient with a h/o HTN who presents to the Emergency Department for evaluation of emesis which onset last night around 1700. Pt started to vomit after having a biopsy of her L kidney yesterday. Pt presented to the ED yesterday for the same sxs as she presents to the ED with for today's visit. The pt was diagnosed with acute post-operative and discharged with tramadol. Symptoms are episodic and moderate in severity. No mitigating or exacerbating factors reported. Associated sxs include nausea and left flank pain secondary to CT-guided left kidney biopsy. Patient denies any fever, chills, abd pain, constipation, dysuria, difficulty urinating, and all other sxs at this time. No further complaints or concerns at this time.       Arrival mode: Personal transportation      PCP: Konstantin Martines MD      Past Medical History:  Past Medical History:   Diagnosis Date    Hypertension        Past Surgical History:  History reviewed. No pertinent surgical history.      Family History:  History reviewed. No pertinent family history.    Social History:   Social History     Tobacco Use    Smoking status: Never Smoker    Substance and Sexual Activity    Alcohol use: Never     Frequency: Never    Drug use: Never    Sexual activity: Unknown        Review of Systems     Review of Systems   Constitutional: Negative for chills and fever.   HENT: Negative for congestion and sore throat.    Respiratory: Negative for cough and shortness of breath.    Cardiovascular: Negative for chest pain.   Gastrointestinal: Positive for nausea and vomiting. Negative for abdominal pain, constipation and diarrhea.   Genitourinary: Positive for flank pain (L (secondary to CT-guided left kidney biopsy)). Negative for difficulty urinating and dysuria.   Musculoskeletal: Negative for back pain.   Skin: Negative for rash.   Neurological: Negative for weakness and headaches.   Hematological: Does not bruise/bleed easily.   All other systems reviewed and are negative.       Physical Exam     Initial Vitals [11/05/20 1715]   BP Pulse Resp Temp SpO2   129/65 70 16 98.5 °F (36.9 °C) 100 %      MAP       --          Physical Exam  Nursing Notes and Vital Signs Reviewed.  Constitutional: Well-developed and well-nourished.   Head: Atraumatic. Normocephalic.  Eyes: EOM intact. No scleral icterus.  ENT: Mucous membranes are moist. Oropharynx is clear and symmetric.    Neck: Supple. Full ROM. No lymphadenopathy.  Cardiovascular: Regular rate. Regular rhythm. No murmurs, rubs, or gallops. Distal pulses are 2+ and symmetric.  Pulmonary/Chest: No respiratory distress. Clear to auscultation bilaterally. No wheezing or rales.  Abdominal: Soft and non-distended.  There is no tenderness.  No rebound, guarding, or rigidity.  Genitourinary: No CVA tenderness  Musculoskeletal: Moves all extremities. No obvious deformities. No calf tenderness.  Skin: Warm and dry.  Neurological:  Alert, awake, and appropriate.  Normal speech.  No acute focal neurological deficits are appreciated.  Psychiatric: Normal affect. Good eye contact. Appropriate in content.     ED Course  "  Procedures  ED Vital Signs:  Vitals:    11/05/20 1715 11/05/20 1901 11/05/20 1917 11/05/20 2026   BP: 129/65  (!) 126/58    Pulse: 70 74 61    Resp: 16  15    Temp: 98.5 °F (36.9 °C)      TempSrc: Oral      SpO2: 100%  100%    Weight:    47.2 kg (104 lb)   Height: 5' 3" (1.6 m)       11/05/20 2233   BP: 131/66   Pulse: 68   Resp: 19   Temp:    TempSrc:    SpO2: 100%   Weight:    Height:        Abnormal Lab Results:  Labs Reviewed   CBC W/ AUTO DIFFERENTIAL - Abnormal; Notable for the following components:       Result Value    Gran # (ANC) 9.8 (*)     Immature Grans (Abs) 0.05 (*)     Lymph # 0.8 (*)     Gran % 88.5 (*)     Lymph % 7.5 (*)     Mono % 3.1 (*)     All other components within normal limits   COMPREHENSIVE METABOLIC PANEL - Abnormal; Notable for the following components:    Sodium 135 (*)     Glucose 123 (*)     BUN 41 (*)     Creatinine 2.6 (*)     eGFR if  24 (*)     eGFR if non  21 (*)     All other components within normal limits   URINALYSIS, REFLEX TO URINE CULTURE - Abnormal; Notable for the following components:    Protein, UA 1+ (*)     Occult Blood UA 2+ (*)     Leukocytes, UA Trace (*)     All other components within normal limits    Narrative:     Specimen Source->Urine   URINALYSIS MICROSCOPIC - Abnormal; Notable for the following components:    RBC, UA 5 (*)     Hyaline Casts, UA 2 (*)     All other components within normal limits    Narrative:     Specimen Source->Urine   LIPASE        All Lab Results:  Results for orders placed or performed during the hospital encounter of 11/05/20   CBC auto differential   Result Value Ref Range    WBC 11.00 3.90 - 12.70 K/uL    RBC 4.34 4.00 - 5.40 M/uL    Hemoglobin 12.5 12.0 - 16.0 g/dL    Hematocrit 37.0 37.0 - 48.5 %    MCV 85 82 - 98 fL    MCH 28.8 27.0 - 31.0 pg    MCHC 33.8 32.0 - 36.0 g/dL    RDW 12.0 11.5 - 14.5 %    Platelets 218 150 - 350 K/uL    MPV 9.9 9.2 - 12.9 fL    Immature Granulocytes 0.5 0.0 - 0.5 % "    Gran # (ANC) 9.8 (H) 1.8 - 7.7 K/uL    Immature Grans (Abs) 0.05 (H) 0.00 - 0.04 K/uL    Lymph # 0.8 (L) 1.0 - 4.8 K/uL    Mono # 0.3 0.3 - 1.0 K/uL    Eos # 0.0 0.0 - 0.5 K/uL    Baso # 0.03 0.00 - 0.20 K/uL    nRBC 0 0 /100 WBC    Gran % 88.5 (H) 38.0 - 73.0 %    Lymph % 7.5 (L) 18.0 - 48.0 %    Mono % 3.1 (L) 4.0 - 15.0 %    Eosinophil % 0.1 0.0 - 8.0 %    Basophil % 0.3 0.0 - 1.9 %    Differential Method Automated    Comprehensive metabolic panel   Result Value Ref Range    Sodium 135 (L) 136 - 145 mmol/L    Potassium 4.5 3.5 - 5.1 mmol/L    Chloride 99 95 - 110 mmol/L    CO2 25 23 - 29 mmol/L    Glucose 123 (H) 70 - 110 mg/dL    BUN 41 (H) 6 - 20 mg/dL    Creatinine 2.6 (H) 0.5 - 1.4 mg/dL    Calcium 10.1 8.7 - 10.5 mg/dL    Total Protein 7.5 6.0 - 8.4 g/dL    Albumin 4.2 3.5 - 5.2 g/dL    Total Bilirubin 0.7 0.1 - 1.0 mg/dL    Alkaline Phosphatase 80 55 - 135 U/L    AST 19 10 - 40 U/L    ALT 15 10 - 44 U/L    Anion Gap 11 8 - 16 mmol/L    eGFR if African American 24 (A) >60 mL/min/1.73 m^2    eGFR if non African American 21 (A) >60 mL/min/1.73 m^2   Lipase   Result Value Ref Range    Lipase 22 4 - 60 U/L   Urinalysis, Reflex to Urine Culture Urine, Clean Catch    Specimen: Urine   Result Value Ref Range    Specimen UA Urine, Clean Catch     Color, UA Yellow Yellow, Straw, Natalia    Appearance, UA Clear Clear    pH, UA 6.0 5.0 - 8.0    Specific Gravity, UA 1.020 1.005 - 1.030    Protein, UA 1+ (A) Negative    Glucose, UA Negative Negative    Ketones, UA Negative Negative    Bilirubin (UA) Negative Negative    Occult Blood UA 2+ (A) Negative    Nitrite, UA Negative Negative    Urobilinogen, UA Negative <2.0 EU/dL    Leukocytes, UA Trace (A) Negative   Urinalysis Microscopic   Result Value Ref Range    RBC, UA 5 (H) 0 - 4 /hpf    WBC, UA 2 0 - 5 /hpf    Bacteria Rare None-Occ /hpf    Squam Epithel, UA 2 /hpf    Hyaline Casts, UA 2 (A) 0-1/lpf /lpf    Microscopic Comment SEE COMMENT          Imaging Results           CT Abdomen Pelvis  Without Contrast (Final result)  Result time 11/05/20 20:56:38    Final result by Raul Ford MD (11/05/20 20:56:38)                 Impression:      Evaluation of solid organ and vascular pathology is limited due to lack of IV contrast.    Small left-sided subcapsular hematoma with a maximum thickness of 20 mm.    Stomach is collapsed which limits evaluation. Mild wall thickening or gastritis not excluded.    See above for additional findings.    All CT scans at this facility use dose modulation, iterative reconstruction, and/or weight based dosing when appropriate to reduce radiation dose to as low as reasonable achievable.      Electronically signed by: Raul Ford MD  Date:    11/05/2020  Time:    20:56             Narrative:    EXAMINATION:  CT ABDOMEN PELVIS WITHOUT CONTRAST    CLINICAL HISTORY:  Nausea/vomiting;    TECHNIQUE:  Low dose axial images, sagittal and coronal reformations were obtained from the lung bases to the pubic symphysis.  Oral contrast was not administered.    COMPARISON:  11/05/2020.    FINDINGS:  Heart: Normal size. No effusion.    Lung Bases: Clear.  Mild dependent changes.    Liver: Normal size and attenuation. No focal lesions.    Gallbladder: No calcified gallstones.    Bile Ducts: No dilatation.    Pancreas: No obvious mass. No peripancreatic fat stranding.    Spleen: Normal.    Adrenals: Normal.    Kidneys/Ureters: Kidneys are small in size.  Small left-sided subcapsular hematoma with a maximum thickness of 20 mm.  Small amount of air in fluid in the perinephric space consistent with recent kidney biopsy with injection of Gel-Foam into the perinephric space for hemostasis.  No evidence of hydronephrosis.  No mass, hydroureteronephrosis, or nephroureterolithiasis.    Bladder: No wall thickening.    Reproductive organs: Normal.    GI Tract/Mesentery: Small hiatal hernia.  Stomach is collapsed which limits evaluation.  Mild wall thickening or gastritis not  excluded.  No evidence of bowel obstruction or inflammation.  Mild constipation.  No evidence of appendicitis.    Peritoneal Space: No ascites or free air.    Retroperitoneum: No significant adenopathy.    Abdominal wall: Normal.    Vasculature: No aneurysm.    Bones: No acute fracture. No suspicious lytic or sclerotic lesions.                               X-Ray Abdomen Flat And Erect (Final result)  Result time 11/05/20 20:09:12    Final result by Raul Ford MD (11/05/20 20:09:12)                 Impression:      Nonobstructive bowel gas pattern.      Electronically signed by: Raul Ford MD  Date:    11/05/2020  Time:    20:09             Narrative:    EXAMINATION:  XR ABDOMEN FLAT AND ERECT    CLINICAL HISTORY:  Vomiting, unspecified    COMPARISON:  None    FINDINGS:  Nonobstructive bowel gas pattern is noted. No radiopaque kidney calculus is identified.  Mild constipation.  No evidence of organomegaly.  The bones demonstrate mild degenerative changes within the lower lumbar region.  The bones are otherwise intact.                                   The Emergency Provider reviewed the vital signs and test results, which are outlined above.     ED Discussion     8:00 PM: Dr. Schultz transfers care of patient to Dr. Noriega pending lab and imaging results.    10:18 PM: Reassessed pt at this time.  Pt states her condition has improved at this time. Discussed with pt all pertinent ED information and results. Discussed pt dx and plan of tx. Gave pt all f/u and return to the ED instructions. All questions and concerns were addressed at this time. Pt expresses understanding of information and instructions, and is comfortable with plan to discharge. Pt is stable for discharge.    I discussed with patient and/or family/caretaker that evaluation in the ED does not suggest any emergent or life threatening medical conditions requiring immediate intervention beyond what was provided in the ED, and I believe patient is  safe for discharge.  Regardless, an unremarkable evaluation in the ED does not preclude the development or presence of a serious of life threatening condition. As such, patient was instructed to return immediately for any worsening or change in current symptoms.       MDM        Medical Decision Making:   Clinical Tests:   Lab Tests: Ordered and Reviewed  Radiological Study: Ordered and Reviewed           ED Medication(s):  Medications   sodium chloride 0.9% bolus 1,000 mL (0 mLs Intravenous Stopped 11/5/20 2233)   promethazine (PHENERGAN) 25 mg in dextrose 5 % 50 mL IVPB (0 mg Intravenous Stopped 11/5/20 2026)       Discharge Medication List as of 11/5/2020 10:17 PM      START taking these medications    Details   ondansetron (ZOFRAN) 4 MG tablet Take 1 tablet (4 mg total) by mouth every 8 (eight) hours as needed for Nausea., Starting Thu 11/5/2020, Print             Follow-up Information     Konstantin Martines MD In 4 days.    Specialty: Family Medicine  Contact information:  [address] [facility] [address]  [phone]                       Scribe Attestation:   Scribe #1: I performed the above scribed service and the documentation accurately describes the services I performed. I attest to the accuracy of the note.     Attending:   Physician Attestation Statement for Scribe #1: I, Carlie Schultz MD, personally performed the services described in this documentation, as scribed by Diane Fraire, in my presence, and it is both accurate and complete.       Scribe Attestation:   Scribe #2: I performed the above scribed service and the documentation accurately describes the services I performed. I attest to the accuracy of the note.    Attending Attestation:           Physician Attestation for Scribe:    Physician Attestation Statement for Scribe #2: I, Wilber Noriega MD, reviewed documentation, as scribed by Fartun Everett in my presence, and it is both accurate and complete. I also acknowledge and  confirm the content of the note done by Scribe #1.           Clinical Impression       ICD-10-CM ICD-9-CM   1. Vomiting  R11.10 787.03   2. Hematoma of left kidney, initial encounter  S37.012A 866.01       Disposition:   Disposition: Discharged  Condition: Stable         Si JANESSA Noriega MD  11/06/20 0537

## 2020-11-10 ENCOUNTER — TELEPHONE (OUTPATIENT)
Dept: RADIOLOGY | Facility: HOSPITAL | Age: 45
End: 2020-11-10

## 2020-11-10 DIAGNOSIS — N04.9 NEPHROTIC SYNDROME: Primary | ICD-10-CM

## 2020-11-10 DIAGNOSIS — N18.32 CHRONIC KIDNEY DISEASE (CKD) STAGE G3B/A3, MODERATELY DECREASED GLOMERULAR FILTRATION RATE (GFR) BETWEEN 30-44 ML/MIN/1.73 SQUARE METER AND ALBUMINURIA CREATININE RATIO GREATER THAN 300 MG/G: Primary | ICD-10-CM

## 2020-11-10 LAB — FINAL PATHOLOGIC DIAGNOSIS: NORMAL

## 2020-11-10 NOTE — TELEPHONE ENCOUNTER
Patient  called and said patient was nauseated, she has med at home.  I notified Dr. Ford, he recommended coming in for US tomorrow.  Called , he said he will check with wife and they will call us back.  Went ahead and put him down for 0800 anyway

## 2020-11-11 ENCOUNTER — TELEPHONE (OUTPATIENT)
Dept: NEPHROLOGY | Facility: CLINIC | Age: 45
End: 2020-11-11

## 2020-11-11 DIAGNOSIS — N02.B9 IGA NEPHROPATHY: Primary | ICD-10-CM

## 2020-11-11 NOTE — TELEPHONE ENCOUNTER
----- Message from William Jenkins sent at 11/11/2020 11:01 AM CST -----  Pt is requesting call from nurse to schedule sooner appt for lab results             Please call pt back at [phone]

## 2020-11-11 NOTE — PROGRESS NOTES
Renal clinic note:  Pt being seen by me per Dr. Dennison's request ASAP, as he is not available on vacation.   Chart was fully reviewed, including kidney biopsy report from 11/4/20.  Pt is a 44 y/o  female with a h/o of HTN and kidney biopsy proven IgA nephropathy with about 25% chronic interstitial fibrosis, but not evidence of crescents. However, the kidney biopsy sample was small.  Labs reviewed, and alarmingly elevated and worsening s Cr was noted, though also noted pt was experiencing sone n/v at the time of the labs about 1 week ago.  Spoke with pt's  by phone and expressed to him in layman's language my clinical concerns for his wife (she did not answer the phone at home).  The following are recommended:    Repeat labs tomorrow at 9 am: u/a, urine prot/Cr, BMP, CBC  Pt will be seen by me tomorrow at 1 pm for advice and recommendations regarding beneficial  And side effects of the recommended therapy.  Pt will bring her daughter with her (she is 21 years old)    The following will be considered, pending labs:  ACE-I therapy  Omega-3 fatty acids (good quality fish oil)  Induction therapy with following:  Prednisone 1 mg/Kg/day x 8 weeks  Cyclophosphamide 2 mg/Kg/day (divied dose) x 8 weeks  Then, maintenance therapy with the following:  Prednisone in lowering tapered doses  Azathioprine 2.5 mg/Kg/day    Néstor Flores MD

## 2020-11-12 ENCOUNTER — CLINICAL SUPPORT (OUTPATIENT)
Dept: NEPHROLOGY | Facility: CLINIC | Age: 45
End: 2020-11-12

## 2020-11-12 ENCOUNTER — LAB VISIT (OUTPATIENT)
Dept: LAB | Facility: HOSPITAL | Age: 45
End: 2020-11-12
Attending: INTERNAL MEDICINE

## 2020-11-12 VITALS
BODY MASS INDEX: 18.98 KG/M2 | SYSTOLIC BLOOD PRESSURE: 148 MMHG | WEIGHT: 107.13 LBS | RESPIRATION RATE: 20 BRPM | HEART RATE: 104 BPM | HEIGHT: 63 IN | DIASTOLIC BLOOD PRESSURE: 76 MMHG

## 2020-11-12 DIAGNOSIS — N02.B9 IGA NEPHROPATHY: ICD-10-CM

## 2020-11-12 DIAGNOSIS — N02.B9 IGA NEPHROPATHY: Primary | ICD-10-CM

## 2020-11-12 LAB
ALBUMIN SERPL BCP-MCNC: 4.2 G/DL (ref 3.5–5.2)
ANION GAP SERPL CALC-SCNC: 11 MMOL/L (ref 8–16)
BACTERIA #/AREA URNS HPF: ABNORMAL /HPF
BASOPHILS # BLD AUTO: 0.04 K/UL (ref 0–0.2)
BASOPHILS NFR BLD: 0.5 % (ref 0–1.9)
BILIRUB UR QL STRIP: NEGATIVE
BUN SERPL-MCNC: 34 MG/DL (ref 6–20)
CALCIUM SERPL-MCNC: 10.4 MG/DL (ref 8.7–10.5)
CHLORIDE SERPL-SCNC: 106 MMOL/L (ref 95–110)
CLARITY UR: CLEAR
CO2 SERPL-SCNC: 28 MMOL/L (ref 23–29)
COLOR UR: YELLOW
CREAT SERPL-MCNC: 2 MG/DL (ref 0.5–1.4)
CREAT UR-MCNC: 130 MG/DL (ref 15–325)
DIFFERENTIAL METHOD: NORMAL
EOSINOPHIL # BLD AUTO: 0.2 K/UL (ref 0–0.5)
EOSINOPHIL NFR BLD: 2.4 % (ref 0–8)
ERYTHROCYTE [DISTWIDTH] IN BLOOD BY AUTOMATED COUNT: 12.4 % (ref 11.5–14.5)
EST. GFR  (AFRICAN AMERICAN): 34 ML/MIN/1.73 M^2
EST. GFR  (NON AFRICAN AMERICAN): 29 ML/MIN/1.73 M^2
GLUCOSE SERPL-MCNC: 117 MG/DL (ref 70–110)
GLUCOSE UR QL STRIP: NEGATIVE
HCT VFR BLD AUTO: 37.8 % (ref 37–48.5)
HGB BLD-MCNC: 12.9 G/DL (ref 12–16)
HGB UR QL STRIP: ABNORMAL
HYALINE CASTS #/AREA URNS LPF: 0 /LPF
IMM GRANULOCYTES # BLD AUTO: 0.02 K/UL (ref 0–0.04)
IMM GRANULOCYTES NFR BLD AUTO: 0.3 % (ref 0–0.5)
KETONES UR QL STRIP: NEGATIVE
LEUKOCYTE ESTERASE UR QL STRIP: NEGATIVE
LYMPHOCYTES # BLD AUTO: 1.5 K/UL (ref 1–4.8)
LYMPHOCYTES NFR BLD: 19 % (ref 18–48)
MCH RBC QN AUTO: 29.7 PG (ref 27–31)
MCHC RBC AUTO-ENTMCNC: 34.1 G/DL (ref 32–36)
MCV RBC AUTO: 87 FL (ref 82–98)
MICROSCOPIC COMMENT: ABNORMAL
MONOCYTES # BLD AUTO: 0.4 K/UL (ref 0.3–1)
MONOCYTES NFR BLD: 5.4 % (ref 4–15)
NEUTROPHILS # BLD AUTO: 5.6 K/UL (ref 1.8–7.7)
NEUTROPHILS NFR BLD: 72.4 % (ref 38–73)
NITRITE UR QL STRIP: NEGATIVE
NRBC BLD-RTO: 0 /100 WBC
PH UR STRIP: 6 [PH] (ref 5–8)
PHOSPHATE SERPL-MCNC: 4.2 MG/DL (ref 2.7–4.5)
PLATELET # BLD AUTO: 216 K/UL (ref 150–350)
PMV BLD AUTO: 9.6 FL (ref 9.2–12.9)
POTASSIUM SERPL-SCNC: 4.6 MMOL/L (ref 3.5–5.1)
PROT UR QL STRIP: ABNORMAL
PROT UR-MCNC: 313 MG/DL (ref 0–15)
PROT/CREAT UR: 2.41 MG/G{CREAT} (ref 0–0.2)
RBC # BLD AUTO: 4.35 M/UL (ref 4–5.4)
RBC #/AREA URNS HPF: 5 /HPF (ref 0–4)
SODIUM SERPL-SCNC: 145 MMOL/L (ref 136–145)
SP GR UR STRIP: 1.02 (ref 1–1.03)
URN SPEC COLLECT METH UR: ABNORMAL
WBC # BLD AUTO: 7.65 K/UL (ref 3.9–12.7)
WBC #/AREA URNS HPF: 3 /HPF (ref 0–5)

## 2020-11-12 PROCEDURE — 36415 COLL VENOUS BLD VENIPUNCTURE: CPT

## 2020-11-12 PROCEDURE — 85025 COMPLETE CBC W/AUTO DIFF WBC: CPT

## 2020-11-12 PROCEDURE — 99999 PR PBB SHADOW E&M-EST. PATIENT-LVL III: CPT | Mod: PBBFAC,,,

## 2020-11-12 PROCEDURE — 81000 URINALYSIS NONAUTO W/SCOPE: CPT

## 2020-11-12 PROCEDURE — 80069 RENAL FUNCTION PANEL: CPT

## 2020-11-12 PROCEDURE — 99213 OFFICE O/P EST LOW 20 MIN: CPT | Mod: PBBFAC

## 2020-11-12 PROCEDURE — 99999 PR PBB SHADOW E&M-EST. PATIENT-LVL III: ICD-10-PCS | Mod: PBBFAC,,,

## 2020-11-12 PROCEDURE — 82570 ASSAY OF URINE CREATININE: CPT

## 2020-11-12 RX ORDER — LISINOPRIL 10 MG/1
10 TABLET ORAL DAILY
Qty: 30 TABLET | Refills: 11 | Status: SHIPPED | OUTPATIENT
Start: 2020-11-12 | End: 2021-05-27 | Stop reason: SDUPTHER

## 2020-11-12 RX ORDER — PREDNISONE 20 MG/1
40 TABLET ORAL DAILY
Qty: 60 TABLET | Refills: 3 | Status: SHIPPED | OUTPATIENT
Start: 2020-11-12 | End: 2021-03-11

## 2021-01-08 ENCOUNTER — LAB VISIT (OUTPATIENT)
Dept: LAB | Facility: HOSPITAL | Age: 46
End: 2021-01-08
Attending: INTERNAL MEDICINE

## 2021-01-08 DIAGNOSIS — N18.32 CHRONIC KIDNEY DISEASE (CKD) STAGE G3B/A3, MODERATELY DECREASED GLOMERULAR FILTRATION RATE (GFR) BETWEEN 30-44 ML/MIN/1.73 SQUARE METER AND ALBUMINURIA CREATININE RATIO GREATER THAN 300 MG/G: ICD-10-CM

## 2021-01-08 LAB
ALBUMIN SERPL BCP-MCNC: 3.9 G/DL (ref 3.5–5.2)
ANION GAP SERPL CALC-SCNC: 9 MMOL/L (ref 8–16)
BUN SERPL-MCNC: 37 MG/DL (ref 6–20)
CALCIUM SERPL-MCNC: 9.6 MG/DL (ref 8.7–10.5)
CHLORIDE SERPL-SCNC: 104 MMOL/L (ref 95–110)
CO2 SERPL-SCNC: 25 MMOL/L (ref 23–29)
CREAT SERPL-MCNC: 1.5 MG/DL (ref 0.5–1.4)
CREAT UR-MCNC: 79 MG/DL (ref 15–325)
EST. GFR  (AFRICAN AMERICAN): 47 ML/MIN/1.73 M^2
EST. GFR  (NON AFRICAN AMERICAN): 41 ML/MIN/1.73 M^2
GLUCOSE SERPL-MCNC: 109 MG/DL (ref 70–110)
PHOSPHATE SERPL-MCNC: 4.1 MG/DL (ref 2.7–4.5)
POTASSIUM SERPL-SCNC: 5.7 MMOL/L (ref 3.5–5.1)
PROT UR-MCNC: 33 MG/DL (ref 0–15)
PROT/CREAT UR: 0.42 MG/G{CREAT} (ref 0–0.2)
PTH-INTACT SERPL-MCNC: 105 PG/ML (ref 9–77)
SODIUM SERPL-SCNC: 138 MMOL/L (ref 136–145)

## 2021-01-08 PROCEDURE — 82570 ASSAY OF URINE CREATININE: CPT

## 2021-01-08 PROCEDURE — 80069 RENAL FUNCTION PANEL: CPT

## 2021-01-08 PROCEDURE — 36415 COLL VENOUS BLD VENIPUNCTURE: CPT

## 2021-01-08 PROCEDURE — 83970 ASSAY OF PARATHORMONE: CPT

## 2021-01-11 ENCOUNTER — TELEPHONE (OUTPATIENT)
Dept: NEPHROLOGY | Facility: CLINIC | Age: 46
End: 2021-01-11

## 2021-03-16 ENCOUNTER — LAB VISIT (OUTPATIENT)
Dept: LAB | Facility: HOSPITAL | Age: 46
End: 2021-03-16
Attending: INTERNAL MEDICINE

## 2021-03-16 DIAGNOSIS — N02.B9 IGA NEPHROPATHY: ICD-10-CM

## 2021-03-16 LAB
BASOPHILS # BLD AUTO: 0.03 K/UL (ref 0–0.2)
BASOPHILS NFR BLD: 0.2 % (ref 0–1.9)
DIFFERENTIAL METHOD: ABNORMAL
EOSINOPHIL # BLD AUTO: 0 K/UL (ref 0–0.5)
EOSINOPHIL NFR BLD: 0 % (ref 0–8)
ERYTHROCYTE [DISTWIDTH] IN BLOOD BY AUTOMATED COUNT: 13.9 % (ref 11.5–14.5)
HCT VFR BLD AUTO: 38.7 % (ref 37–48.5)
HGB BLD-MCNC: 12.6 G/DL (ref 12–16)
IMM GRANULOCYTES # BLD AUTO: 0.29 K/UL (ref 0–0.04)
IMM GRANULOCYTES NFR BLD AUTO: 2.2 % (ref 0–0.5)
LYMPHOCYTES # BLD AUTO: 0.6 K/UL (ref 1–4.8)
LYMPHOCYTES NFR BLD: 4.5 % (ref 18–48)
MCH RBC QN AUTO: 32.9 PG (ref 27–31)
MCHC RBC AUTO-ENTMCNC: 32.6 G/DL (ref 32–36)
MCV RBC AUTO: 101 FL (ref 82–98)
MONOCYTES # BLD AUTO: 0.4 K/UL (ref 0.3–1)
MONOCYTES NFR BLD: 3 % (ref 4–15)
NEUTROPHILS # BLD AUTO: 11.7 K/UL (ref 1.8–7.7)
NEUTROPHILS NFR BLD: 90.1 % (ref 38–73)
NRBC BLD-RTO: 0 /100 WBC
PLATELET # BLD AUTO: 162 K/UL (ref 150–350)
PMV BLD AUTO: 10.7 FL (ref 9.2–12.9)
RBC # BLD AUTO: 3.83 M/UL (ref 4–5.4)
WBC # BLD AUTO: 13.02 K/UL (ref 3.9–12.7)

## 2021-03-16 PROCEDURE — 85025 COMPLETE CBC W/AUTO DIFF WBC: CPT | Performed by: INTERNAL MEDICINE

## 2021-03-16 PROCEDURE — 36415 COLL VENOUS BLD VENIPUNCTURE: CPT | Performed by: INTERNAL MEDICINE

## 2021-03-16 PROCEDURE — 80069 RENAL FUNCTION PANEL: CPT | Performed by: INTERNAL MEDICINE

## 2021-03-17 LAB
ALBUMIN SERPL BCP-MCNC: 3.5 G/DL (ref 3.5–5.2)
ANION GAP SERPL CALC-SCNC: 7 MMOL/L (ref 8–16)
BUN SERPL-MCNC: 37 MG/DL (ref 6–20)
CALCIUM SERPL-MCNC: 9.3 MG/DL (ref 8.7–10.5)
CHLORIDE SERPL-SCNC: 106 MMOL/L (ref 95–110)
CO2 SERPL-SCNC: 27 MMOL/L (ref 23–29)
CREAT SERPL-MCNC: 1.2 MG/DL (ref 0.5–1.4)
EST. GFR  (AFRICAN AMERICAN): >60 ML/MIN/1.73 M^2
EST. GFR  (NON AFRICAN AMERICAN): 53.9 ML/MIN/1.73 M^2
GLUCOSE SERPL-MCNC: 143 MG/DL (ref 70–110)
PHOSPHATE SERPL-MCNC: 3.2 MG/DL (ref 2.7–4.5)
POTASSIUM SERPL-SCNC: 5.2 MMOL/L (ref 3.5–5.1)
SODIUM SERPL-SCNC: 140 MMOL/L (ref 136–145)

## 2021-03-23 ENCOUNTER — OFFICE VISIT (OUTPATIENT)
Dept: NEPHROLOGY | Facility: CLINIC | Age: 46
End: 2021-03-23

## 2021-03-23 VITALS
HEART RATE: 88 BPM | DIASTOLIC BLOOD PRESSURE: 110 MMHG | HEIGHT: 63 IN | RESPIRATION RATE: 20 BRPM | SYSTOLIC BLOOD PRESSURE: 170 MMHG | BODY MASS INDEX: 20.08 KG/M2 | WEIGHT: 113.31 LBS

## 2021-03-23 DIAGNOSIS — N02.B9 IGA NEPHROPATHY: Primary | ICD-10-CM

## 2021-03-23 PROCEDURE — 99999 PR PBB SHADOW E&M-EST. PATIENT-LVL III: ICD-10-PCS | Mod: PBBFAC,,, | Performed by: INTERNAL MEDICINE

## 2021-03-23 PROCEDURE — 99999 PR PBB SHADOW E&M-EST. PATIENT-LVL III: CPT | Mod: PBBFAC,,, | Performed by: INTERNAL MEDICINE

## 2021-03-23 PROCEDURE — 99215 OFFICE O/P EST HI 40 MIN: CPT | Mod: S$PBB,,, | Performed by: INTERNAL MEDICINE

## 2021-03-23 PROCEDURE — 99215 PR OFFICE/OUTPT VISIT, EST, LEVL V, 40-54 MIN: ICD-10-PCS | Mod: S$PBB,,, | Performed by: INTERNAL MEDICINE

## 2021-03-23 PROCEDURE — 99213 OFFICE O/P EST LOW 20 MIN: CPT | Mod: PBBFAC | Performed by: INTERNAL MEDICINE

## 2021-03-23 RX ORDER — METOPROLOL SUCCINATE 25 MG/1
25 TABLET, EXTENDED RELEASE ORAL 2 TIMES DAILY
Qty: 60 TABLET | Refills: 11 | Status: SHIPPED | OUTPATIENT
Start: 2021-03-23 | End: 2021-03-24 | Stop reason: ALTCHOICE

## 2021-03-23 RX ORDER — AMOXICILLIN 500 MG
1 CAPSULE ORAL DAILY
COMMUNITY

## 2021-03-23 RX ORDER — LOVASTATIN 40 MG/1
40 TABLET ORAL NIGHTLY
Qty: 90 TABLET | Refills: 3 | Status: SHIPPED | OUTPATIENT
Start: 2021-03-23 | End: 2022-05-10

## 2021-03-23 RX ORDER — METOPROLOL SUCCINATE 25 MG/1
25 TABLET, EXTENDED RELEASE ORAL DAILY
Qty: 30 TABLET | Refills: 11 | Status: SHIPPED | OUTPATIENT
Start: 2021-03-23 | End: 2021-03-23

## 2021-03-24 ENCOUNTER — TELEPHONE (OUTPATIENT)
Dept: NEPHROLOGY | Facility: CLINIC | Age: 46
End: 2021-03-24

## 2021-03-24 RX ORDER — METOPROLOL TARTRATE 25 MG/1
25 TABLET, FILM COATED ORAL 2 TIMES DAILY
Qty: 60 TABLET | Refills: 11 | Status: SHIPPED | OUTPATIENT
Start: 2021-03-24 | End: 2022-04-19 | Stop reason: SDUPTHER

## 2021-05-20 ENCOUNTER — LAB VISIT (OUTPATIENT)
Dept: LAB | Facility: HOSPITAL | Age: 46
End: 2021-05-20
Attending: INTERNAL MEDICINE

## 2021-05-20 DIAGNOSIS — N02.B9 IGA NEPHROPATHY: ICD-10-CM

## 2021-05-20 LAB
ALBUMIN SERPL BCP-MCNC: 3.5 G/DL (ref 3.5–5.2)
ANION GAP SERPL CALC-SCNC: 9 MMOL/L (ref 8–16)
BASOPHILS # BLD AUTO: 0.05 K/UL (ref 0–0.2)
BASOPHILS NFR BLD: 0.8 % (ref 0–1.9)
BUN SERPL-MCNC: 32 MG/DL (ref 6–20)
CALCIUM SERPL-MCNC: 9.4 MG/DL (ref 8.7–10.5)
CHLORIDE SERPL-SCNC: 106 MMOL/L (ref 95–110)
CO2 SERPL-SCNC: 27 MMOL/L (ref 23–29)
CREAT SERPL-MCNC: 1.5 MG/DL (ref 0.5–1.4)
DIFFERENTIAL METHOD: ABNORMAL
EOSINOPHIL # BLD AUTO: 0.1 K/UL (ref 0–0.5)
EOSINOPHIL NFR BLD: 1.1 % (ref 0–8)
ERYTHROCYTE [DISTWIDTH] IN BLOOD BY AUTOMATED COUNT: 13.4 % (ref 11.5–14.5)
EST. GFR  (AFRICAN AMERICAN): 47.5 ML/MIN/1.73 M^2
EST. GFR  (NON AFRICAN AMERICAN): 41.2 ML/MIN/1.73 M^2
GLUCOSE SERPL-MCNC: 103 MG/DL (ref 70–110)
HCT VFR BLD AUTO: 37.1 % (ref 37–48.5)
HGB BLD-MCNC: 11.8 G/DL (ref 12–16)
IMM GRANULOCYTES # BLD AUTO: 0.08 K/UL (ref 0–0.04)
IMM GRANULOCYTES NFR BLD AUTO: 1.2 % (ref 0–0.5)
LYMPHOCYTES # BLD AUTO: 1.4 K/UL (ref 1–4.8)
LYMPHOCYTES NFR BLD: 21.5 % (ref 18–48)
MCH RBC QN AUTO: 31.6 PG (ref 27–31)
MCHC RBC AUTO-ENTMCNC: 31.8 G/DL (ref 32–36)
MCV RBC AUTO: 99 FL (ref 82–98)
MONOCYTES # BLD AUTO: 0.5 K/UL (ref 0.3–1)
MONOCYTES NFR BLD: 6.9 % (ref 4–15)
NEUTROPHILS # BLD AUTO: 4.4 K/UL (ref 1.8–7.7)
NEUTROPHILS NFR BLD: 68.5 % (ref 38–73)
NRBC BLD-RTO: 0 /100 WBC
PHOSPHATE SERPL-MCNC: 3.4 MG/DL (ref 2.7–4.5)
PLATELET # BLD AUTO: 222 K/UL (ref 150–450)
PMV BLD AUTO: 10.8 FL (ref 9.2–12.9)
POTASSIUM SERPL-SCNC: 4.9 MMOL/L (ref 3.5–5.1)
RBC # BLD AUTO: 3.74 M/UL (ref 4–5.4)
SODIUM SERPL-SCNC: 142 MMOL/L (ref 136–145)
WBC # BLD AUTO: 6.48 K/UL (ref 3.9–12.7)

## 2021-05-20 PROCEDURE — 80069 RENAL FUNCTION PANEL: CPT | Performed by: INTERNAL MEDICINE

## 2021-05-20 PROCEDURE — 85025 COMPLETE CBC W/AUTO DIFF WBC: CPT | Performed by: INTERNAL MEDICINE

## 2021-05-20 PROCEDURE — 36415 COLL VENOUS BLD VENIPUNCTURE: CPT | Performed by: INTERNAL MEDICINE

## 2021-05-27 ENCOUNTER — OFFICE VISIT (OUTPATIENT)
Dept: NEPHROLOGY | Facility: CLINIC | Age: 46
End: 2021-05-27

## 2021-05-27 VITALS
WEIGHT: 111.13 LBS | SYSTOLIC BLOOD PRESSURE: 174 MMHG | RESPIRATION RATE: 20 BRPM | HEART RATE: 74 BPM | HEIGHT: 63 IN | DIASTOLIC BLOOD PRESSURE: 102 MMHG | BODY MASS INDEX: 19.69 KG/M2

## 2021-05-27 DIAGNOSIS — N02.B9 IGA NEPHROPATHY: ICD-10-CM

## 2021-05-27 PROCEDURE — 99213 OFFICE O/P EST LOW 20 MIN: CPT | Mod: PBBFAC | Performed by: INTERNAL MEDICINE

## 2021-05-27 PROCEDURE — 99999 PR PBB SHADOW E&M-EST. PATIENT-LVL III: ICD-10-PCS | Mod: PBBFAC,,, | Performed by: INTERNAL MEDICINE

## 2021-05-27 PROCEDURE — 99215 OFFICE O/P EST HI 40 MIN: CPT | Mod: S$PBB,,, | Performed by: INTERNAL MEDICINE

## 2021-05-27 PROCEDURE — 99215 PR OFFICE/OUTPT VISIT, EST, LEVL V, 40-54 MIN: ICD-10-PCS | Mod: S$PBB,,, | Performed by: INTERNAL MEDICINE

## 2021-05-27 PROCEDURE — 99999 PR PBB SHADOW E&M-EST. PATIENT-LVL III: CPT | Mod: PBBFAC,,, | Performed by: INTERNAL MEDICINE

## 2021-05-27 RX ORDER — AMLODIPINE BESYLATE 10 MG/1
10 TABLET ORAL DAILY
Qty: 30 TABLET | Refills: 11 | Status: SHIPPED | OUTPATIENT
Start: 2021-05-27 | End: 2021-09-08

## 2021-05-27 RX ORDER — LISINOPRIL 10 MG/1
10 TABLET ORAL DAILY
Qty: 30 TABLET | Refills: 11 | Status: SHIPPED | OUTPATIENT
Start: 2021-05-27 | End: 2022-08-09 | Stop reason: SDUPTHER

## 2021-05-27 RX ORDER — PREDNISONE 5 MG/1
5 TABLET ORAL DAILY
Qty: 30 TABLET | Refills: 11 | Status: SHIPPED | OUTPATIENT
Start: 2021-05-27 | End: 2022-06-03 | Stop reason: SDUPTHER

## 2021-06-09 ENCOUNTER — TELEPHONE (OUTPATIENT)
Dept: NEPHROLOGY | Facility: CLINIC | Age: 46
End: 2021-06-09

## 2021-08-19 ENCOUNTER — TELEPHONE (OUTPATIENT)
Dept: NEPHROLOGY | Facility: CLINIC | Age: 46
End: 2021-08-19

## 2021-09-01 ENCOUNTER — LAB VISIT (OUTPATIENT)
Dept: LAB | Facility: HOSPITAL | Age: 46
End: 2021-09-01
Attending: INTERNAL MEDICINE

## 2021-09-01 DIAGNOSIS — N02.B9 IGA NEPHROPATHY: ICD-10-CM

## 2021-09-01 LAB
ALBUMIN SERPL BCP-MCNC: 3.8 G/DL (ref 3.5–5.2)
ANION GAP SERPL CALC-SCNC: 8 MMOL/L (ref 8–16)
BACTERIA #/AREA URNS HPF: NORMAL /HPF
BASOPHILS # BLD AUTO: 0.03 K/UL (ref 0–0.2)
BASOPHILS NFR BLD: 0.3 % (ref 0–1.9)
BILIRUB UR QL STRIP: NEGATIVE
BUN SERPL-MCNC: 27 MG/DL (ref 6–20)
CALCIUM SERPL-MCNC: 9.3 MG/DL (ref 8.7–10.5)
CHLORIDE SERPL-SCNC: 109 MMOL/L (ref 95–110)
CLARITY UR: CLEAR
CO2 SERPL-SCNC: 23 MMOL/L (ref 23–29)
COLOR UR: YELLOW
CREAT SERPL-MCNC: 1.5 MG/DL (ref 0.5–1.4)
CREAT UR-MCNC: 62 MG/DL (ref 15–325)
DIFFERENTIAL METHOD: ABNORMAL
EOSINOPHIL # BLD AUTO: 0.1 K/UL (ref 0–0.5)
EOSINOPHIL NFR BLD: 0.8 % (ref 0–8)
ERYTHROCYTE [DISTWIDTH] IN BLOOD BY AUTOMATED COUNT: 13.1 % (ref 11.5–14.5)
EST. GFR  (AFRICAN AMERICAN): 47 ML/MIN/1.73 M^2
EST. GFR  (NON AFRICAN AMERICAN): 41 ML/MIN/1.73 M^2
GLUCOSE SERPL-MCNC: 97 MG/DL (ref 70–110)
GLUCOSE UR QL STRIP: NEGATIVE
HCT VFR BLD AUTO: 38.1 % (ref 37–48.5)
HGB BLD-MCNC: 12.4 G/DL (ref 12–16)
HGB UR QL STRIP: ABNORMAL
IMM GRANULOCYTES # BLD AUTO: 0.03 K/UL (ref 0–0.04)
IMM GRANULOCYTES NFR BLD AUTO: 0.3 % (ref 0–0.5)
KETONES UR QL STRIP: NEGATIVE
LEUKOCYTE ESTERASE UR QL STRIP: ABNORMAL
LYMPHOCYTES # BLD AUTO: 1.2 K/UL (ref 1–4.8)
LYMPHOCYTES NFR BLD: 13.4 % (ref 18–48)
MCH RBC QN AUTO: 30.2 PG (ref 27–31)
MCHC RBC AUTO-ENTMCNC: 32.5 G/DL (ref 32–36)
MCV RBC AUTO: 93 FL (ref 82–98)
MICROSCOPIC COMMENT: NORMAL
MONOCYTES # BLD AUTO: 0.3 K/UL (ref 0.3–1)
MONOCYTES NFR BLD: 3.4 % (ref 4–15)
NEUTROPHILS # BLD AUTO: 7.6 K/UL (ref 1.8–7.7)
NEUTROPHILS NFR BLD: 81.8 % (ref 38–73)
NITRITE UR QL STRIP: NEGATIVE
NRBC BLD-RTO: 0 /100 WBC
PH UR STRIP: 6 [PH] (ref 5–8)
PHOSPHATE SERPL-MCNC: 3.3 MG/DL (ref 2.7–4.5)
PLATELET # BLD AUTO: 215 K/UL (ref 150–450)
PMV BLD AUTO: 10.6 FL (ref 9.2–12.9)
POTASSIUM SERPL-SCNC: 4.2 MMOL/L (ref 3.5–5.1)
PROT UR QL STRIP: NEGATIVE
PROT UR-MCNC: 7 MG/DL (ref 0–15)
PROT/CREAT UR: 0.11 MG/G{CREAT} (ref 0–0.2)
RBC # BLD AUTO: 4.11 M/UL (ref 4–5.4)
RBC #/AREA URNS HPF: 1 /HPF (ref 0–4)
SODIUM SERPL-SCNC: 140 MMOL/L (ref 136–145)
SP GR UR STRIP: 1.01 (ref 1–1.03)
SQUAMOUS #/AREA URNS HPF: 1 /HPF
URN SPEC COLLECT METH UR: ABNORMAL
WBC # BLD AUTO: 9.25 K/UL (ref 3.9–12.7)
WBC #/AREA URNS HPF: 2 /HPF (ref 0–5)

## 2021-09-01 PROCEDURE — 84156 ASSAY OF PROTEIN URINE: CPT | Performed by: INTERNAL MEDICINE

## 2021-09-01 PROCEDURE — 85025 COMPLETE CBC W/AUTO DIFF WBC: CPT | Performed by: INTERNAL MEDICINE

## 2021-09-01 PROCEDURE — 36415 COLL VENOUS BLD VENIPUNCTURE: CPT | Performed by: INTERNAL MEDICINE

## 2021-09-01 PROCEDURE — 81000 URINALYSIS NONAUTO W/SCOPE: CPT | Performed by: INTERNAL MEDICINE

## 2021-09-01 PROCEDURE — 80069 RENAL FUNCTION PANEL: CPT | Performed by: INTERNAL MEDICINE

## 2021-09-08 ENCOUNTER — OFFICE VISIT (OUTPATIENT)
Dept: NEPHROLOGY | Facility: CLINIC | Age: 46
End: 2021-09-08

## 2021-09-08 VITALS
HEIGHT: 63 IN | DIASTOLIC BLOOD PRESSURE: 82 MMHG | SYSTOLIC BLOOD PRESSURE: 120 MMHG | HEART RATE: 88 BPM | BODY MASS INDEX: 18.75 KG/M2 | RESPIRATION RATE: 14 BRPM | WEIGHT: 105.81 LBS

## 2021-09-08 DIAGNOSIS — N02.B9 IGA NEPHROPATHY: ICD-10-CM

## 2021-09-08 PROCEDURE — 99215 PR OFFICE/OUTPT VISIT, EST, LEVL V, 40-54 MIN: ICD-10-PCS | Mod: S$PBB,,, | Performed by: INTERNAL MEDICINE

## 2021-09-08 PROCEDURE — 99999 PR PBB SHADOW E&M-EST. PATIENT-LVL III: CPT | Mod: PBBFAC,,, | Performed by: INTERNAL MEDICINE

## 2021-09-08 PROCEDURE — 99999 PR PBB SHADOW E&M-EST. PATIENT-LVL III: ICD-10-PCS | Mod: PBBFAC,,, | Performed by: INTERNAL MEDICINE

## 2021-09-08 PROCEDURE — 99213 OFFICE O/P EST LOW 20 MIN: CPT | Mod: PBBFAC | Performed by: INTERNAL MEDICINE

## 2021-09-08 PROCEDURE — 99215 OFFICE O/P EST HI 40 MIN: CPT | Mod: S$PBB,,, | Performed by: INTERNAL MEDICINE

## 2022-02-10 NOTE — PROGRESS NOTES
Luisa Aguilar is a 45 y.o. female for whom nephrology consult has been requested to evaluate and give opinion.   Renal clinic f/u note  Reason for f/u and chief c/o: f/u post kidney biopsy  Referring physician: Dr. Dennison, nephrology    HPI: Pt was seen and examined post kidney biopsy. Chart was reviewed. Pt is a 44 y/o  female who has kidney biopsy proven IgA nephropathy. Kidney biopsy reviewed today shows about 25% chronic interstitial fibrosis. Importantly there was no sign of any crescents, however sample size was small. H/o reviewed, noted pt was initially referred to us for proteinuria and microscopic hematuria. Pt has no acute or new c/o's, no blood in urine, no SOB, no abd pain, has a LE rash. Had n/v post kidney biopsy which is resolved now.    PAST MEDICAL HISTORY:  IgA nephropathy, kidney biopsy proven 11/4/20, Hypertension, hypercholestrolemia.    PAST SURGICAL HISTORY:  She  has no past surgical history on file.    SOCIAL HISTORY:  She  reports that she has never smoked. She does not have any smokeless tobacco history on file. She reports that she does not drink alcohol or use drugs.    FAMILY MEDICAL HISTORY:  Her family history is not on file.    Review of patient's allergies indicates:  No Known Allergies        Prior to Admission medications    Medication Sig Start Date End Date Taking? Authorizing Provider   amLODIPine (NORVASC) 10 MG tablet Take 10 mg by mouth once daily.    Historical Provider   lisinopriL 10 MG tablet Take 1 tablet (10 mg total) by mouth once daily. 11/12/20 11/12/21  Néstor Flores MD   lovastatin (MEVACOR) 40 MG tablet Take 40 mg by mouth every evening.    Historical Provider   ondansetron (ZOFRAN) 4 MG tablet Take 1 tablet (4 mg total) by mouth every 8 (eight) hours as needed for Nausea.  Patient not taking: Reported on 11/12/2020 11/5/20   Wilber Noriega MD   predniSONE (DELTASONE) 20 MG tablet Take 2 tablets (40 mg total) by mouth once daily. 11/12/20    Néstor Flores MD   traMADoL (ULTRAM) 50 mg tablet Take 1 tablet (50 mg total) by mouth every 8 (eight) hours as needed.  Patient not taking: Reported on 11/12/2020 11/4/20 11/13/20  Osvaldo Wells Jr., MD        REVIEW OF SYSTEMS:  Patient has no fever, fatigue, visual changes, chest pain, edema, cough, dyspnea, nausea, vomiting, constipation, diarrhea, arthralgias, pruritis, dizziness, weakness, depression, confusion.    [unfilled]    PHYSICAL EXAM:   vitals were not taken for this visit.   Gen: WDWN female in no apparent distress  Psych: Normal mood and affect  Skin: + leukocytoclastic vasculitis type rash   Eyes: Normal conjunctiva   ENT: Normal hearing  Neck: No JVD  Chest: Clear with no rales, rhonchi, wheezing with normal effort  CV: Regular with no murmurs, gallops or rubs  Abd: Soft, nontender, no distension, positive bowel sounds  Ext: No edema    Labs reviewed  BMP  Lab Results   Component Value Date     11/12/2020    K 4.6 11/12/2020     11/12/2020    CO2 28 11/12/2020    BUN 34 (H) 11/12/2020    CREATININE 2.0 (H) 11/12/2020    CALCIUM 10.4 11/12/2020    ANIONGAP 11 11/12/2020    ESTGFRAFRICA 34 (A) 11/12/2020    EGFRNONAA 29 (A) 11/12/2020     Lab Results   Component Value Date    WBC 7.65 11/12/2020    HGB 12.9 11/12/2020    HCT 37.8 11/12/2020    MCV 87 11/12/2020     11/12/2020       U/a: 3+ blood, 2 +protein, 5 RBC's  Urine prot/Cr 1.3 g    IMPRESSION AND RECOMMENDATIONS: 46 y/o  female with kidney biopsy proven IgA nephropathy:    1. Renal: IgA nephropathy  Abnormal s Cr and evidence of renal damage on kidney biopsy  Post kidney biopsy s Cr of 2.6 was related to vomiting and fluid loss.  Proteinuria, sub-nephrotic range  Microscopic hematuria    The following are recommended  -BP control, goal 130/80  -low salt diet < 2-3 g/day  -ACE-I for renal protection, lisinopril 10 mg po qd  -omega-3 fatty acids (good quality fish oil) qd  -statins  -prednisone 40 mg po qd x 8  weeks  -RTC on 1/11/21 at 1:30 pm    Discussed with pt and  in full details in layman's language    Total time spent 60 minutes including time needed to review the records, the   patient evaluation, documentation, face-to-face discussion with the patient,   more than 50% of the time was spent on coordination of care and counseling.    Level V visit.  No charge was dropped  Post kidney biopsy visit               Brigette Fermin

## 2022-03-17 ENCOUNTER — TELEPHONE (OUTPATIENT)
Dept: NEPHROLOGY | Facility: CLINIC | Age: 47
End: 2022-03-17

## 2022-03-17 NOTE — TELEPHONE ENCOUNTER
Returned call to patient's daughter. She just wanted to know if patient should do labs prior to. I informed her yes and scheduled the labs.

## 2022-03-17 NOTE — TELEPHONE ENCOUNTER
----- Message from Toshia Dowling sent at 3/17/2022  9:46 AM CDT -----  Patient Call Back    Who Called: Le Calvo (daughter)    What is the request in detail:PT daughter calling to speak with someone regarding the pt having blood work scheduled before her 8mth follow up. Please call the pt daughter regarding her concerns.    Can the clinic reply by [facility]?    Best Call Back Number: [phone]

## 2022-05-04 ENCOUNTER — TELEPHONE (OUTPATIENT)
Dept: NEPHROLOGY | Facility: CLINIC | Age: 47
End: 2022-05-04

## 2022-05-04 NOTE — TELEPHONE ENCOUNTER
L/m that I ha dto change her appt The patient left the office before the visit was finished. Date/time of new appt.5/4/22/sf

## 2022-06-03 RX ORDER — PREDNISONE 5 MG/1
5 TABLET ORAL DAILY
Qty: 30 TABLET | Refills: 11 | Status: SHIPPED | OUTPATIENT
Start: 2022-06-03 | End: 2023-05-30 | Stop reason: SDUPTHER

## 2022-06-03 NOTE — TELEPHONE ENCOUNTER
Incoming call from patient's daughter requesting a refill on Prednisone. Follow up scheduled for July. Please advise.

## 2022-07-08 ENCOUNTER — LAB VISIT (OUTPATIENT)
Dept: LAB | Facility: HOSPITAL | Age: 47
End: 2022-07-08
Attending: INTERNAL MEDICINE

## 2022-07-08 DIAGNOSIS — N02.B9 IGA NEPHROPATHY: ICD-10-CM

## 2022-07-08 LAB
ALBUMIN SERPL BCP-MCNC: 3.8 G/DL (ref 3.5–5.2)
ANION GAP SERPL CALC-SCNC: 9 MMOL/L (ref 8–16)
BASOPHILS # BLD AUTO: 0.03 K/UL (ref 0–0.2)
BASOPHILS NFR BLD: 0.6 % (ref 0–1.9)
BUN SERPL-MCNC: 29 MG/DL (ref 6–20)
CALCIUM SERPL-MCNC: 9.4 MG/DL (ref 8.7–10.5)
CHLORIDE SERPL-SCNC: 106 MMOL/L (ref 95–110)
CO2 SERPL-SCNC: 25 MMOL/L (ref 23–29)
CREAT SERPL-MCNC: 1.5 MG/DL (ref 0.5–1.4)
DIFFERENTIAL METHOD: NORMAL
EOSINOPHIL # BLD AUTO: 0.1 K/UL (ref 0–0.5)
EOSINOPHIL NFR BLD: 2.5 % (ref 0–8)
ERYTHROCYTE [DISTWIDTH] IN BLOOD BY AUTOMATED COUNT: 11.9 % (ref 11.5–14.5)
EST. GFR  (AFRICAN AMERICAN): 47 ML/MIN/1.73 M^2
EST. GFR  (NON AFRICAN AMERICAN): 41 ML/MIN/1.73 M^2
GLUCOSE SERPL-MCNC: 89 MG/DL (ref 70–110)
HCT VFR BLD AUTO: 38.4 % (ref 37–48.5)
HGB BLD-MCNC: 12.8 G/DL (ref 12–16)
IMM GRANULOCYTES # BLD AUTO: 0.01 K/UL (ref 0–0.04)
IMM GRANULOCYTES NFR BLD AUTO: 0.2 % (ref 0–0.5)
LYMPHOCYTES # BLD AUTO: 1.6 K/UL (ref 1–4.8)
LYMPHOCYTES NFR BLD: 33.1 % (ref 18–48)
MCH RBC QN AUTO: 30.8 PG (ref 27–31)
MCHC RBC AUTO-ENTMCNC: 33.3 G/DL (ref 32–36)
MCV RBC AUTO: 92 FL (ref 82–98)
MONOCYTES # BLD AUTO: 0.3 K/UL (ref 0.3–1)
MONOCYTES NFR BLD: 5.8 % (ref 4–15)
NEUTROPHILS # BLD AUTO: 2.8 K/UL (ref 1.8–7.7)
NEUTROPHILS NFR BLD: 57.8 % (ref 38–73)
NRBC BLD-RTO: 0 /100 WBC
PHOSPHATE SERPL-MCNC: 3.9 MG/DL (ref 2.7–4.5)
PLATELET # BLD AUTO: 216 K/UL (ref 150–450)
PMV BLD AUTO: 9.9 FL (ref 9.2–12.9)
POTASSIUM SERPL-SCNC: 4.7 MMOL/L (ref 3.5–5.1)
RBC # BLD AUTO: 4.16 M/UL (ref 4–5.4)
SODIUM SERPL-SCNC: 140 MMOL/L (ref 136–145)
WBC # BLD AUTO: 4.84 K/UL (ref 3.9–12.7)

## 2022-07-08 PROCEDURE — 85025 COMPLETE CBC W/AUTO DIFF WBC: CPT | Performed by: INTERNAL MEDICINE

## 2022-07-08 PROCEDURE — 80069 RENAL FUNCTION PANEL: CPT | Performed by: INTERNAL MEDICINE

## 2022-07-08 PROCEDURE — 36415 COLL VENOUS BLD VENIPUNCTURE: CPT | Performed by: INTERNAL MEDICINE

## 2022-07-12 ENCOUNTER — TELEPHONE (OUTPATIENT)
Dept: NEPHROLOGY | Facility: CLINIC | Age: 47
End: 2022-07-12

## 2022-07-12 ENCOUNTER — OFFICE VISIT (OUTPATIENT)
Dept: NEPHROLOGY | Facility: CLINIC | Age: 47
End: 2022-07-12

## 2022-07-12 VITALS
WEIGHT: 103.63 LBS | HEART RATE: 60 BPM | SYSTOLIC BLOOD PRESSURE: 134 MMHG | HEIGHT: 63 IN | DIASTOLIC BLOOD PRESSURE: 88 MMHG | RESPIRATION RATE: 18 BRPM | BODY MASS INDEX: 18.36 KG/M2

## 2022-07-12 DIAGNOSIS — N02.B9 IGA NEPHROPATHY: Primary | ICD-10-CM

## 2022-07-12 PROCEDURE — 99999 PR PBB SHADOW E&M-EST. PATIENT-LVL III: CPT | Mod: PBBFAC,,, | Performed by: INTERNAL MEDICINE

## 2022-07-12 PROCEDURE — 99999 PR PBB SHADOW E&M-EST. PATIENT-LVL III: ICD-10-PCS | Mod: PBBFAC,,, | Performed by: INTERNAL MEDICINE

## 2022-07-12 PROCEDURE — 99215 PR OFFICE/OUTPT VISIT, EST, LEVL V, 40-54 MIN: ICD-10-PCS | Mod: S$PBB,,, | Performed by: INTERNAL MEDICINE

## 2022-07-12 PROCEDURE — 99213 OFFICE O/P EST LOW 20 MIN: CPT | Mod: PBBFAC | Performed by: INTERNAL MEDICINE

## 2022-07-12 PROCEDURE — 99215 OFFICE O/P EST HI 40 MIN: CPT | Mod: S$PBB,,, | Performed by: INTERNAL MEDICINE

## 2022-07-12 RX ORDER — METOPROLOL TARTRATE 25 MG/1
25 TABLET, FILM COATED ORAL 2 TIMES DAILY
Qty: 180 TABLET | Refills: 4 | Status: SHIPPED | OUTPATIENT
Start: 2022-07-12 | End: 2023-10-16 | Stop reason: SDUPTHER

## 2022-07-12 NOTE — PROGRESS NOTES
Renal clinic f/u note:  Date of clinic visit: 7/12/22  Reason for f/u and chief c/o: h/o of IgA nephropathy and renal failure     HPI: Pt is a 45 y/o  female with a h/o of kidney biopsy proven IgA nephropathy who presents for f/u. Pt initially presented with renal failure and nephrotic syndrome in Nov 2020. Pt is s/p high dose prednisone treatment with good response. Pt also received ACE-I therapy, omega-3-fatty acids, and statins. Pt was last seen about 9 months ago. Chart was reviewed. No new events. On f/u today, pt presents with her daughter. Pt has no new c/o's, no blood in urine, no SOB, no leg swelling. Prior facial puffiness, due to prednisone, has resolved.      To review: Kidney biopsy showed about 25% chronic interstitial fibrosis. Importantly there was no sign of any crescents, however sample size was small.        PAST MEDICAL HISTORY:  IgA nephropathy, kidney biopsy proven 11/4/20, Hypertension, hypercholestrolemia.     PAST SURGICAL HISTORY:  She  has no past surgical history on file.     SOCIAL HISTORY:  She  reports that she has never smoked. She does not have any smokeless tobacco history on file. She reports that she does not drink alcohol or use drugs.     FAMILY MEDICAL HISTORY:  Her family history is not on file.     Review of patient's allergies indicates:  No Known Allergies     Meds reviewed    Current Outpatient Medications:     lisinopriL 10 MG tablet, Take 1 tablet (10 mg total) by mouth once daily., Disp: 30 tablet, Rfl: 11    lovastatin (MEVACOR) 40 MG tablet, TAKE 1 TABLET BY MOUTH EVERY DAY IN THE EVENING, Disp: 90 tablet, Rfl: 3    omega-3 fatty acids/fish oil (FISH OIL-OMEGA-3 FATTY ACIDS) 300-1,000 mg capsule, Take 1 capsule by mouth once daily., Disp: , Rfl:     predniSONE (DELTASONE) 5 MG tablet, Take 1 tablet (5 mg total) by mouth once daily., Disp: 30 tablet, Rfl: 11    metoprolol tartrate (LOPRESSOR) 25 MG tablet, Take 1 tablet (25 mg total) by mouth 2 (two) times  "daily., Disp: 180 tablet, Rfl: 4     REVIEW OF SYSTEMS:  Patient has no fever, fatigue, visual changes, chest pain, edema, cough, dyspnea, nausea, vomiting, constipation, diarrhea, arthralgias, pruritis, dizziness, weakness, depression, confusion.     PHYSICAL EXAM:  Blood pressure 134/88 pulse 60, resp. rate 20, height 5' 3" (1.6 m), weight 47.0 Kg, from 48.0 Kg, from 50.4 Kg, from 51.4 kg  Gen:    WDWN female in no apparent distress  Psych: Normal mood and affect  Skin:    prior leukocytoclastic vasculitis type rash on the LE's appears to have disappeared  No facial edema   Neck:   No JVD  Chest:  Clear with no rales, rhonchi, wheezing with normal effort  CV:      Regular with no murmurs, gallops or rubs  Abd:     Soft, nontender, no distension  Ext:      No edema     Labs reviewed  BMP  Lab Results   Component Value Date     07/08/2022    K 4.7 07/08/2022     07/08/2022    CO2 25 07/08/2022    BUN 29 (H) 07/08/2022    CREATININE 1.5 (H) 07/08/2022    CALCIUM 9.4 07/08/2022    ANIONGAP 9 07/08/2022    ESTGFRAFRICA 47 (A) 07/08/2022    EGFRNONAA 41 (A) 07/08/2022     Lab Results   Component Value Date    WBC 4.84 07/08/2022    HGB 12.8 07/08/2022    HCT 38.4 07/08/2022    MCV 92 07/08/2022     07/08/2022      U/a: no protein, from 1+, trace blood, from 1+ , 1 RBC, no casts   Urine prot/Cr 0.11 g, from 0.11 g, from 0.39 g, from 1.3 g from 2.4 g max     IMPRESSION AND RECOMMENDATIONS: 47 y/o  female with kidney biopsy proven IgA nephropathy presents for f/u after high dose prednisone treatment:     1. Renal: kidney biopsy proven IgA nephropathy  Pt remains well and clinically improved and stable. Good response to prednisone therapy at high dose  S/p rapid taper  On minimal (physiologic) dose of prednisone now     JODI resolved  Has some residual renal damage, CKD stage 3  s Cr stable, no change, not worse  Proteinuria, has improved further and is now minimal in microalbuminuric " range  Microscopic hematuria, has improved overall  BP controlled, no longer hypertensive     Side effects of prednisone: have improved/resolved after dose reduction  Edema, easy bruising, HTN: all improved.     HTN: BP controlled  Meds reviewed  No longer on amlodipine after BP improved     The following are recommended   -continue prednisone 5 mg po qd.  - continue low salt diet < 2-3 g/day  - continue lisinopril and metoprolol  -continue omega-3 fatty acids (good quality fish oil) qd  -continue statins  -RTC in 8 months, appt was made for pt  -advised pt to return sooner if seeing blood in urine or if Cr higher at PCP's office  Discussed with pt and daughter in full details in layman's language        Plans and recommendations:  As discussed above.  Pt's daughter translated.  Total time spent 40 minutes including time needed to review the records, the   patient evaluation, documentation, face-to-face discussion with the patient,   more than 50% of the time was spent on coordination of care and counseling.    Level V visit.     Néstor Flores MD

## 2023-02-22 ENCOUNTER — TELEPHONE (OUTPATIENT)
Dept: NEPHROLOGY | Facility: CLINIC | Age: 48
End: 2023-02-22

## 2023-02-22 NOTE — TELEPHONE ENCOUNTER
----- Message from Margarita Shea sent at 2/22/2023  8:55 AM CST -----  Contact: [phone]  Patients daughter Le requesting a call back in regards to labs. She would like to know if the patient will need to do lab work before her appt on 05/08. Please call back at [phone]. Thanks KD

## 2023-05-01 ENCOUNTER — LAB VISIT (OUTPATIENT)
Dept: LAB | Facility: HOSPITAL | Age: 48
End: 2023-05-01
Attending: INTERNAL MEDICINE

## 2023-05-01 DIAGNOSIS — N02.B9 IGA NEPHROPATHY: ICD-10-CM

## 2023-05-01 LAB
BILIRUB UR QL STRIP: NEGATIVE
CLARITY UR: CLEAR
COLOR UR: YELLOW
CREAT UR-MCNC: 82.6 MG/DL (ref 15–325)
GLUCOSE UR QL STRIP: NEGATIVE
HGB UR QL STRIP: ABNORMAL
KETONES UR QL STRIP: NEGATIVE
LEUKOCYTE ESTERASE UR QL STRIP: NEGATIVE
MICROSCOPIC COMMENT: NORMAL
NITRITE UR QL STRIP: NEGATIVE
PH UR STRIP: 5 [PH] (ref 5–8)
PROT UR QL STRIP: ABNORMAL
PROT UR-MCNC: 20 MG/DL (ref 0–15)
PROT/CREAT UR: 0.24 MG/G{CREAT} (ref 0–0.2)
RBC #/AREA URNS HPF: 2 /HPF (ref 0–4)
SP GR UR STRIP: 1.01 (ref 1–1.03)
URN SPEC COLLECT METH UR: ABNORMAL
UROBILINOGEN UR STRIP-ACNC: NEGATIVE EU/DL
WBC #/AREA URNS HPF: 1 /HPF (ref 0–5)

## 2023-05-01 PROCEDURE — 81000 URINALYSIS NONAUTO W/SCOPE: CPT | Performed by: INTERNAL MEDICINE

## 2023-05-01 PROCEDURE — 82570 ASSAY OF URINE CREATININE: CPT | Performed by: INTERNAL MEDICINE

## 2023-05-08 ENCOUNTER — OFFICE VISIT (OUTPATIENT)
Dept: NEPHROLOGY | Facility: CLINIC | Age: 48
End: 2023-05-08

## 2023-05-08 VITALS
BODY MASS INDEX: 18.16 KG/M2 | WEIGHT: 102.5 LBS | RESPIRATION RATE: 18 BRPM | SYSTOLIC BLOOD PRESSURE: 160 MMHG | HEART RATE: 74 BPM | HEIGHT: 63 IN | DIASTOLIC BLOOD PRESSURE: 90 MMHG

## 2023-05-08 DIAGNOSIS — N02.B9 IGA NEPHROPATHY: Primary | ICD-10-CM

## 2023-05-08 PROCEDURE — 99213 OFFICE O/P EST LOW 20 MIN: CPT | Mod: PBBFAC | Performed by: INTERNAL MEDICINE

## 2023-05-08 PROCEDURE — 99215 PR OFFICE/OUTPT VISIT, EST, LEVL V, 40-54 MIN: ICD-10-PCS | Mod: S$PBB,,, | Performed by: INTERNAL MEDICINE

## 2023-05-08 PROCEDURE — 99999 PR PBB SHADOW E&M-EST. PATIENT-LVL III: ICD-10-PCS | Mod: PBBFAC,,, | Performed by: INTERNAL MEDICINE

## 2023-05-08 PROCEDURE — 99999 PR PBB SHADOW E&M-EST. PATIENT-LVL III: CPT | Mod: PBBFAC,,, | Performed by: INTERNAL MEDICINE

## 2023-05-08 PROCEDURE — 99215 OFFICE O/P EST HI 40 MIN: CPT | Mod: S$PBB,,, | Performed by: INTERNAL MEDICINE

## 2023-05-08 NOTE — PROGRESS NOTES
Renal clinic f/u note:  Date of clinic visit: 5/8/23  Reason for f/u and chief c/o: h/o of IgA nephropathy and renal failure     HPI: Pt is a 46 y/o  female with a h/o of kidney biopsy proven IgA nephropathy who presents for f/u. Pt initially presented with JODI/renal failure and nephrotic syndrome in Nov 2020. Pt is s/p high dose prednisone treatment  (Nov 2020 to March 2021) with good response. Pt has also received ACE-I therapy, omega-3-fatty acids, and statins. Pt was last seen in July 2022. Chart was reviewed. No new events. On f/u today, pt presents with her daughter. Pt has no new c/o's, no blood in urine, no SOB, no leg swelling. No facial puffiness, has resolved.      To review: Kidney biopsy showed about 25% chronic interstitial fibrosis. Importantly there was no sign of any crescents, however sample size was small.        PAST MEDICAL HISTORY:  IgA nephropathy, kidney biopsy proven 11/4/20 (on high dose prednisone Nov 2020 to March 2021, then slow taper until May 2021), Hypertension, hypercholestrolemia.     PAST SURGICAL HISTORY:  She  has no past surgical history on file.     SOCIAL HISTORY:  She  reports that she has never smoked. She does not have any smokeless tobacco history on file. She reports that she does not drink alcohol or use drugs.     FAMILY MEDICAL HISTORY:  Her family history is not on file.     Review of patient's allergies indicates:  No Known Allergies     Meds reviewed    Current Outpatient Medications:     lisinopriL 10 MG tablet, TAKE 1 TABLET BY MOUTH EVERY DAY, Disp: 90 tablet, Rfl: 3    lovastatin (MEVACOR) 40 MG tablet, TAKE 1 TABLET BY MOUTH EVERY DAY IN THE EVENING, Disp: 90 tablet, Rfl: 3    metoprolol tartrate (LOPRESSOR) 25 MG tablet, Take 1 tablet (25 mg total) by mouth 2 (two) times daily., Disp: 180 tablet, Rfl: 4    omega-3 fatty acids/fish oil (FISH OIL-OMEGA-3 FATTY ACIDS) 300-1,000 mg capsule, Take 1 capsule by mouth once daily., Disp: , Rfl:     predniSONE  (DELTASONE) 5 MG tablet, Take 1 tablet (5 mg total) by mouth once daily., Disp: 30 tablet, Rfl: 11     REVIEW OF SYSTEMS:  Patient has no fever, fatigue, visual changes, chest pain, edema, cough, dyspnea, nausea, vomiting, constipation, diarrhea, arthralgias, pruritis, dizziness, weakness, depression, confusion.     PHYSICAL EXAM:  Blood pressure 160/90 on arrival, repeat BP after 5 min of rest 130/70 pulse 60, weight 446.5 Kg, from 47.0 Kg, from 48.0 Kg, from 50.4 Kg, from 51.4 kg  Gen:    WDWN female in no apparent distress  Psych: Normal mood and affect  Skin:    prior leukocytoclastic vasculitis type rash on the LE's appears to have disappeared  No facial edema   Neck:   No JVD  Chest:  Clear with no rales, rhonchi, wheezing with normal effort  CV:      Regular with no murmurs, gallops or rubs  Abd:     Soft, nontender, no distension  Ext:      No edema     Labs reviewed  BMP  Lab Results   Component Value Date     05/01/2023    K 4.5 05/01/2023     05/01/2023    CO2 28 05/01/2023    BUN 29 (H) 05/01/2023    CREATININE 1.3 05/01/2023    CALCIUM 9.5 05/01/2023    ANIONGAP 8 05/01/2023    EGFRNORACEVR 50 (A) 05/01/2023     Lab Results   Component Value Date    WBC 5.51 05/01/2023    HGB 12.7 05/01/2023    HCT 38.5 05/01/2023    MCV 94 05/01/2023     05/01/2023        U/a: trace protein, from 1+, trace blood, from 1+ , 1 RBC, no casts   Urine prot/Cr 0.24, from 0.11 g, from 0.11 g, from 0.39 g, from 1.3 g from 2.4 g max     IMPRESSION AND RECOMMENDATIONS: 46 y/o  female with kidney biopsy proven IgA nephropathy presents for f/u, pt received high dose prednisone treatment:     1. Renal: kidney biopsy proven IgA nephropathy  JODI remains resolved  Pt is clinically well and improved and stable. Good response to prednisone therapy at high dose  S/p high dose prednisone form Nov 2020 to March 2021, then slow taper until May 2021  Has remained on ACE-I therapy, statins, and good quality fish  oil  On minimal (physiologic) dose of prednisone now     Has some residual renal damage, CKD stage 3  s Cr stable, slightly lower  Proteinuria, in microalbuminuric range. No longer sub-nephrotic  Microscopic hematuria, has improved overall  BP controlled, no longer hypertensive     Side effects of prednisone: have improved/resolved after dose reduction  Edema, easy bruising, HTN: all improved.     HTN: BP controlled  Meds reviewed    The following are recommended   -continue low dose prednisone 5 mg po qd. May d/c next visit  - continue low salt diet < 2-3 g/day  - continue lisinopril and metoprolol  -continue omega-3 fatty acids (good quality fish oil) qd  -continue statins  -RTC in 9 months  -advised pt to return sooner if seeing blood in urine or if Cr higher at PCP's office  Discussed with pt and daughter in full details in layman's language        Plans and recommendations:  As discussed above.  Pt's daughter translated.  Total time spent 40 minutes including time needed to review the records, the   patient evaluation, documentation, face-to-face discussion with the patient,   more than 50% of the time was spent on coordination of care and counseling.    Level V visit.     Néstor Flores MD

## 2023-05-30 RX ORDER — PREDNISONE 5 MG/1
TABLET ORAL
Qty: 30 TABLET | Refills: 11 | Status: SHIPPED | OUTPATIENT
Start: 2023-05-30 | End: 2023-11-29 | Stop reason: ALTCHOICE

## 2023-05-31 RX ORDER — LOVASTATIN 40 MG/1
TABLET ORAL
Qty: 90 TABLET | Refills: 3 | Status: SHIPPED | OUTPATIENT
Start: 2023-05-31

## 2023-08-25 DIAGNOSIS — N02.B9 IGA NEPHROPATHY: ICD-10-CM

## 2023-08-25 RX ORDER — LISINOPRIL 10 MG/1
TABLET ORAL
Qty: 90 TABLET | Refills: 3 | Status: SHIPPED | OUTPATIENT
Start: 2023-08-25

## 2023-10-16 RX ORDER — METOPROLOL TARTRATE 25 MG/1
25 TABLET, FILM COATED ORAL 2 TIMES DAILY
Qty: 180 TABLET | Refills: 4 | Status: SHIPPED | OUTPATIENT
Start: 2023-10-16 | End: 2023-11-29

## 2023-10-16 RX ORDER — METOPROLOL TARTRATE 25 MG/1
25 TABLET, FILM COATED ORAL 2 TIMES DAILY
Qty: 180 TABLET | Refills: 4 | Status: SHIPPED | OUTPATIENT
Start: 2023-10-16

## 2023-10-16 NOTE — TELEPHONE ENCOUNTER
----- Message from Russell Presley sent at 10/16/2023  2:47 PM CDT -----  Contact: [phone]  Patient needs a refill on metoprolol tartrate (LOPRESSOR) 25 MG tablet called into [facility] pharmacy at [phone]. patient is completely out of medication.Please call patient at  [phone], if you have any questions.         [facility] [id number] [facility] [place] - [address]  [address] [place] [address]  Phone: [phone] Fax: [phone]             Thanks KB

## 2023-11-29 ENCOUNTER — LAB VISIT (OUTPATIENT)
Dept: LAB | Facility: HOSPITAL | Age: 48
End: 2023-11-29
Attending: INTERNAL MEDICINE

## 2023-11-29 ENCOUNTER — OFFICE VISIT (OUTPATIENT)
Dept: NEPHROLOGY | Facility: CLINIC | Age: 48
End: 2023-11-29

## 2023-11-29 ENCOUNTER — TELEPHONE (OUTPATIENT)
Dept: NEPHROLOGY | Facility: CLINIC | Age: 48
End: 2023-11-29

## 2023-11-29 VITALS
RESPIRATION RATE: 18 BRPM | BODY MASS INDEX: 18.36 KG/M2 | WEIGHT: 103.63 LBS | HEART RATE: 80 BPM | DIASTOLIC BLOOD PRESSURE: 80 MMHG | HEIGHT: 63 IN | SYSTOLIC BLOOD PRESSURE: 150 MMHG

## 2023-11-29 DIAGNOSIS — N02.B9 IGA NEPHROPATHY: Primary | ICD-10-CM

## 2023-11-29 DIAGNOSIS — N02.B9 IGA NEPHROPATHY: ICD-10-CM

## 2023-11-29 LAB
ALBUMIN SERPL BCP-MCNC: 4.2 G/DL (ref 3.5–5.2)
ANION GAP SERPL CALC-SCNC: 13 MMOL/L (ref 8–16)
BACTERIA #/AREA URNS HPF: ABNORMAL /HPF
BASOPHILS # BLD AUTO: 0.05 K/UL (ref 0–0.2)
BASOPHILS NFR BLD: 0.6 % (ref 0–1.9)
BILIRUB UR QL STRIP: NEGATIVE
BUN SERPL-MCNC: 43 MG/DL (ref 6–20)
CALCIUM SERPL-MCNC: 9.9 MG/DL (ref 8.7–10.5)
CHLORIDE SERPL-SCNC: 101 MMOL/L (ref 95–110)
CLARITY UR: CLEAR
CO2 SERPL-SCNC: 24 MMOL/L (ref 23–29)
COLOR UR: YELLOW
CREAT SERPL-MCNC: 1.6 MG/DL (ref 0.5–1.4)
CREAT UR-MCNC: 105 MG/DL (ref 15–325)
DIFFERENTIAL METHOD: ABNORMAL
EOSINOPHIL # BLD AUTO: 0.1 K/UL (ref 0–0.5)
EOSINOPHIL NFR BLD: 1.3 % (ref 0–8)
ERYTHROCYTE [DISTWIDTH] IN BLOOD BY AUTOMATED COUNT: 11.8 % (ref 11.5–14.5)
EST. GFR  (NO RACE VARIABLE): 39 ML/MIN/1.73 M^2
GLUCOSE SERPL-MCNC: 85 MG/DL (ref 70–110)
GLUCOSE UR QL STRIP: NEGATIVE
HCT VFR BLD AUTO: 44.4 % (ref 37–48.5)
HGB BLD-MCNC: 14.3 G/DL (ref 12–16)
HGB UR QL STRIP: ABNORMAL
IMM GRANULOCYTES # BLD AUTO: 0.06 K/UL (ref 0–0.04)
IMM GRANULOCYTES NFR BLD AUTO: 0.7 % (ref 0–0.5)
KETONES UR QL STRIP: NEGATIVE
LEUKOCYTE ESTERASE UR QL STRIP: NEGATIVE
LYMPHOCYTES # BLD AUTO: 1.3 K/UL (ref 1–4.8)
LYMPHOCYTES NFR BLD: 16 % (ref 18–48)
MCH RBC QN AUTO: 30.2 PG (ref 27–31)
MCHC RBC AUTO-ENTMCNC: 32.2 G/DL (ref 32–36)
MCV RBC AUTO: 94 FL (ref 82–98)
MICROSCOPIC COMMENT: ABNORMAL
MONOCYTES # BLD AUTO: 0.4 K/UL (ref 0.3–1)
MONOCYTES NFR BLD: 5 % (ref 4–15)
NEUTROPHILS # BLD AUTO: 6.4 K/UL (ref 1.8–7.7)
NEUTROPHILS NFR BLD: 76.4 % (ref 38–73)
NITRITE UR QL STRIP: NEGATIVE
NRBC BLD-RTO: 0 /100 WBC
PH UR STRIP: 6 [PH] (ref 5–8)
PHOSPHATE SERPL-MCNC: 4.4 MG/DL (ref 2.7–4.5)
PLATELET # BLD AUTO: 289 K/UL (ref 150–450)
PMV BLD AUTO: 11 FL (ref 9.2–12.9)
POTASSIUM SERPL-SCNC: 4.6 MMOL/L (ref 3.5–5.1)
PROT UR QL STRIP: ABNORMAL
PROT UR-MCNC: 25 MG/DL (ref 0–15)
PROT/CREAT UR: 0.24 MG/G{CREAT} (ref 0–0.2)
RBC # BLD AUTO: 4.73 M/UL (ref 4–5.4)
RBC #/AREA URNS HPF: 5 /HPF (ref 0–4)
SODIUM SERPL-SCNC: 138 MMOL/L (ref 136–145)
SP GR UR STRIP: 1.01 (ref 1–1.03)
SQUAMOUS #/AREA URNS HPF: 1 /HPF
URN SPEC COLLECT METH UR: ABNORMAL
WBC # BLD AUTO: 8.36 K/UL (ref 3.9–12.7)
WBC #/AREA URNS HPF: 2 /HPF (ref 0–5)

## 2023-11-29 PROCEDURE — 85025 COMPLETE CBC W/AUTO DIFF WBC: CPT | Performed by: INTERNAL MEDICINE

## 2023-11-29 PROCEDURE — 80069 RENAL FUNCTION PANEL: CPT | Performed by: INTERNAL MEDICINE

## 2023-11-29 PROCEDURE — 99999 PR PBB SHADOW E&M-EST. PATIENT-LVL III: CPT | Mod: PBBFAC,,, | Performed by: INTERNAL MEDICINE

## 2023-11-29 PROCEDURE — 36415 COLL VENOUS BLD VENIPUNCTURE: CPT | Performed by: INTERNAL MEDICINE

## 2023-11-29 PROCEDURE — 99999 PR PBB SHADOW E&M-EST. PATIENT-LVL III: ICD-10-PCS | Mod: PBBFAC,,, | Performed by: INTERNAL MEDICINE

## 2023-11-29 PROCEDURE — 99215 PR OFFICE/OUTPT VISIT, EST, LEVL V, 40-54 MIN: ICD-10-PCS | Mod: S$PBB,,, | Performed by: INTERNAL MEDICINE

## 2023-11-29 PROCEDURE — 81000 URINALYSIS NONAUTO W/SCOPE: CPT | Performed by: INTERNAL MEDICINE

## 2023-11-29 PROCEDURE — 99215 OFFICE O/P EST HI 40 MIN: CPT | Mod: S$PBB,,, | Performed by: INTERNAL MEDICINE

## 2023-11-29 PROCEDURE — 82570 ASSAY OF URINE CREATININE: CPT | Performed by: INTERNAL MEDICINE

## 2023-11-29 PROCEDURE — 99213 OFFICE O/P EST LOW 20 MIN: CPT | Mod: PBBFAC | Performed by: INTERNAL MEDICINE

## 2023-11-29 RX ORDER — CHLORTHALIDONE 25 MG/1
25 TABLET ORAL DAILY
Qty: 45 TABLET | Refills: 3 | Status: SHIPPED | OUTPATIENT
Start: 2023-11-29 | End: 2024-01-17 | Stop reason: SDUPTHER

## 2023-11-29 NOTE — PROGRESS NOTES
Renal clinic f/u note:  Date of clinic visit: 11/28/23  Reason for f/u and chief c/o: h/o of IgA nephropathy and renal failure     HPI: Pt is a 49 y/o  female with a h/o of kidney biopsy proven IgA nephropathy who presents for f/u. Pt initially presented with JODI/renal failure and nephrotic syndrome in Nov 2020. Pt is s/p high dose prednisone treatment  (Nov 2020 to March 2021) with good response. Pt has also received ACE-I therapy, omega-3-fatty acids, and statins. Pt was last seen in May 2023. Chart was reviewed.     On f/u today, pt brings outside labs from urgent care visits 1 month ago and 1 week ago, Cr was 1.5 and 1.7, respectively. K was 6.1, and urgent care held lisinopril. PCP has added chlorthalidone 1/2 of 25 mg po qd. Pt was being treated for UTI by urgent care and has now completed abx treatment. On f/u today, pt has no new c/o's, no blood in urine, no SOB, no leg swelling.      To review: Kidney biopsy showed about 25% chronic interstitial fibrosis. Importantly there was no sign of any crescents, however sample size was small.        PAST MEDICAL HISTORY:  IgA nephropathy, kidney biopsy proven 11/4/20 (on high dose prednisone Nov 2020 to March 2021, then slow taper until May 2021), Hypertension, hypercholestrolemia.     PAST SURGICAL HISTORY:  She  has no past surgical history on file.     SOCIAL HISTORY:  She  reports that she has never smoked. She does not have any smokeless tobacco history on file. She reports that she does not drink alcohol or use drugs.     FAMILY MEDICAL HISTORY:  Her family history is not on file.     Review of patient's allergies indicates:  No Known Allergies     Meds reviewed     Current Outpatient Medications:     lisinopriL 10 MG tablet, TAKE 1 TABLET BY MOUTH EVERY DAY, Disp: 90 tablet, Rfl: 3    lovastatin (MEVACOR) 40 MG tablet, TAKE 1 TABLET BY MOUTH EVERY DAY IN THE EVENING, Disp: 90 tablet, Rfl: 3    metoprolol tartrate (LOPRESSOR) 25 MG tablet, Take 1 tablet  (25 mg total) by mouth 2 (two) times daily., Disp: 180 tablet, Rfl: 4    omega-3 fatty acids/fish oil (FISH OIL-OMEGA-3 FATTY ACIDS) 300-1,000 mg capsule, Take 1 capsule by mouth once daily., Disp: , Rfl:     predniSONE (DELTASONE) 5 MG tablet, Take 1 tablet (5 mg total) by mouth once daily., Disp: 30 tablet, Rfl: 11     REVIEW OF SYSTEMS:  Patient has no fever, fatigue, visual changes, chest pain, edema, cough, dyspnea, nausea, vomiting, constipation, diarrhea, arthralgias, pruritis, dizziness, weakness, depression, confusion.     PHYSICAL EXAM:  Blood pressure 150/80, pulse 80, weight 47.0 Kg, from 46.5 Kg, from 47.0 Kg  Gen:    WDWN female in no apparent distress  Psych: Normal mood and affect  Skin:    prior leukocytoclastic vasculitis type rash on the LE's have disappeared  No facial edema   Neck:   No JVD  Chest:  Clear with no rales, rhonchi, wheezing with normal effort  CV:      Regular with no murmurs, gallops or rubs  Abd:     Soft, nontender, no distension  Ext:      No edema     Labs reviewed, Cr was 1.7 1 week ago with K 6.1, Cr was 1.5 1 month ago  BMP pending today       U/a: trace protein, from 1+, 2+ , 5 RBC, no casts   Urine prot/Cr pending today, last visit 0.24, from 0.11 g, from 0.11 g, from 0.39 g, from 1.3 g from 2.4 g max       IMPRESSION AND RECOMMENDATIONS: 49 y/o  female with kidney biopsy proven IgA nephropathy presents for f/u, pt had recent UTI:     1. Renal: labs pending today. Most recent Cr values at urgent care close to prior baseline  H/o of kidney biopsy proven IgA nephropathy  Previously responded well to treatment with high dose prednisone and JODI resolved  Pt is clinically well and improved and stable.   S/p high dose prednisone form Nov 2020 to March 2021, then slow taper until May 2021  Has remained on ACE-I therapy, statins, and good quality fish oil  On minimal (physiologic) dose of prednisone now     Has some residual renal damage, CKD stage 3  Proteinuria, in  microalbuminuric range. No longer sub-nephrotic  Microscopic hematuria, has improved overall  BP controlled, no longer hypertensive, though slightly higher today  Noted pCP added chlorthalidone     Side effects of prednisone: have improved/resolved after dose reduction  Edema, easy bruising, HTN: all improved.     Hyperkalemia.  Repeat labs pending continue to hold lisinopril, until new labs available     The following are recommended   -stop prednisone 5 mg po qd.   - continue low salt diet < 2-3 g/day  - hold lisinopril   - continue metoprolol  -continue omega-3 fatty acids (good quality fish oil) qd  -continue statins  -RTC in 6 months, or as indicated per pending labs today  -advised pt to return sooner if seeing blood in urine or if Cr higher at PCP's office        Plans and recommendations:  As discussed above.  Total time spent 40 minutes including time needed to review the records, the   patient evaluation, documentation, face-to-face discussion with the patient,   more than 50% of the time was spent on coordination of care and counseling.    Level V visit.     Néstor Flores MD

## 2023-12-12 ENCOUNTER — TELEPHONE (OUTPATIENT)
Dept: NEPHROLOGY | Facility: CLINIC | Age: 48
End: 2023-12-12

## 2023-12-13 ENCOUNTER — TELEPHONE (OUTPATIENT)
Dept: NEPHROLOGY | Facility: CLINIC | Age: 48
End: 2023-12-13

## 2023-12-13 NOTE — TELEPHONE ENCOUNTER
----- Message from Ashleigh Jenkins sent at 12/13/2023  1:43 PM CST -----  Contact: Ma  .Type:  Patient Returning Call    Who Called:Ma  Who Left Message for Patient:nurse  Does the patient know what this is regarding?:yes  Would the patient rather a call back or a response via [facility]? Call back  Best Call Back Number:.[phone]  Additional Information: na        Thanks  FRANSISCO

## 2023-12-13 NOTE — TELEPHONE ENCOUNTER
Let her know that Dr Flores said she she is stable and no active disease process at this time. She thanked me. 12/13//23/sf

## 2024-01-17 RX ORDER — CHLORTHALIDONE 25 MG/1
25 TABLET ORAL DAILY
Qty: 45 TABLET | Refills: 3 | Status: SHIPPED | OUTPATIENT
Start: 2024-01-17 | End: 2024-05-30

## 2024-01-17 NOTE — TELEPHONE ENCOUNTER
----- Message from Elina Andrade sent at 1/17/2024  1:24 PM CST -----  Type:  RX Refill Request    Who Called: Le Calvo  Refill or New Rx:refill  RX Name and Strength:chlorthalidone  How is the patient currently taking it? (ex. 1XDay):1XDay  Is this a 30 day or 90 day RX:30  Preferred Pharmacy with phone number:.  [id number] [place] - [address]  [address] [address]  Phone: [phone] Fax: [phone]  Local or Mail Order:Local  Ordering Provider:Dr Flores  Would the patient rather a call back or a response via [facility]? call  Best Call Back Number:[phone]  Additional Information: .    Thank you

## 2024-04-09 RX ORDER — LOVASTATIN 40 MG/1
40 TABLET ORAL NIGHTLY
Qty: 30 TABLET | Refills: 0 | Status: SHIPPED | OUTPATIENT
Start: 2024-04-09 | End: 2024-06-03

## 2024-04-09 NOTE — TELEPHONE ENCOUNTER
----- Message from Dre Jenkins sent at 4/9/2024  9:19 AM CDT -----  Contact: [phone]  Type:  RX Refill Request    Who Called: MA  Refill or New Rx:refill   RX Name and Strength:lovastatin (MEVACOR) 40 MG tablet  How is the patient currently taking it? (ex. 1XDay):  Is this a 30 day or 90 day RX:90  Preferred Pharmacy with phone number:  [id number] [place] - [address]  [address] [place] [address]  Phone: [phone] Fax: [phone]  Local or Mail Order:local   Ordering Provider:Dr Flores   Would the patient rather a call back or a response via [facility]? Call back   Best Call Back Number:[phone]   Additional Information:

## 2024-04-30 ENCOUNTER — LAB VISIT (OUTPATIENT)
Dept: LAB | Facility: HOSPITAL | Age: 49
End: 2024-04-30
Attending: INTERNAL MEDICINE

## 2024-04-30 DIAGNOSIS — N02.B9 IGA NEPHROPATHY: ICD-10-CM

## 2024-04-30 LAB
BASOPHILS # BLD AUTO: 0.04 K/UL (ref 0–0.2)
BASOPHILS NFR BLD: 0.8 % (ref 0–1.9)
DIFFERENTIAL METHOD BLD: NORMAL
EOSINOPHIL # BLD AUTO: 0.2 K/UL (ref 0–0.5)
EOSINOPHIL NFR BLD: 3.1 % (ref 0–8)
ERYTHROCYTE [DISTWIDTH] IN BLOOD BY AUTOMATED COUNT: 12.4 % (ref 11.5–14.5)
HCT VFR BLD AUTO: 40.9 % (ref 37–48.5)
HGB BLD-MCNC: 13.2 G/DL (ref 12–16)
IMM GRANULOCYTES # BLD AUTO: 0.01 K/UL (ref 0–0.04)
IMM GRANULOCYTES NFR BLD AUTO: 0.2 % (ref 0–0.5)
LYMPHOCYTES # BLD AUTO: 1.2 K/UL (ref 1–4.8)
LYMPHOCYTES NFR BLD: 25.6 % (ref 18–48)
MCH RBC QN AUTO: 29.6 PG (ref 27–31)
MCHC RBC AUTO-ENTMCNC: 32.3 G/DL (ref 32–36)
MCV RBC AUTO: 92 FL (ref 82–98)
MONOCYTES # BLD AUTO: 0.3 K/UL (ref 0.3–1)
MONOCYTES NFR BLD: 6.1 % (ref 4–15)
NEUTROPHILS # BLD AUTO: 3.1 K/UL (ref 1.8–7.7)
NEUTROPHILS NFR BLD: 64.2 % (ref 38–73)
NRBC BLD-RTO: 0 /100 WBC
PLATELET # BLD AUTO: 232 K/UL (ref 150–450)
PMV BLD AUTO: 10.8 FL (ref 9.2–12.9)
RBC # BLD AUTO: 4.46 M/UL (ref 4–5.4)
WBC # BLD AUTO: 4.77 K/UL (ref 3.9–12.7)

## 2024-04-30 PROCEDURE — 85025 COMPLETE CBC W/AUTO DIFF WBC: CPT | Performed by: INTERNAL MEDICINE

## 2024-04-30 PROCEDURE — 80069 RENAL FUNCTION PANEL: CPT | Performed by: INTERNAL MEDICINE

## 2024-04-30 PROCEDURE — 36415 COLL VENOUS BLD VENIPUNCTURE: CPT | Performed by: INTERNAL MEDICINE

## 2024-05-01 LAB
ALBUMIN SERPL BCP-MCNC: 3.8 G/DL (ref 3.5–5.2)
ANION GAP SERPL CALC-SCNC: 8 MMOL/L (ref 8–16)
BUN SERPL-MCNC: 41 MG/DL (ref 6–20)
CALCIUM SERPL-MCNC: 9.7 MG/DL (ref 8.7–10.5)
CHLORIDE SERPL-SCNC: 104 MMOL/L (ref 95–110)
CO2 SERPL-SCNC: 29 MMOL/L (ref 23–29)
CREAT SERPL-MCNC: 1.5 MG/DL (ref 0.5–1.4)
EST. GFR  (NO RACE VARIABLE): 42.2 ML/MIN/1.73 M^2
GLUCOSE SERPL-MCNC: 53 MG/DL (ref 70–110)
PHOSPHATE SERPL-MCNC: 3.8 MG/DL (ref 2.7–4.5)
POTASSIUM SERPL-SCNC: 3.9 MMOL/L (ref 3.5–5.1)
SODIUM SERPL-SCNC: 141 MMOL/L (ref 136–145)

## 2024-05-07 ENCOUNTER — OFFICE VISIT (OUTPATIENT)
Dept: DERMATOLOGY | Facility: CLINIC | Age: 49
End: 2024-05-07

## 2024-05-07 ENCOUNTER — OFFICE VISIT (OUTPATIENT)
Dept: NEPHROLOGY | Facility: CLINIC | Age: 49
End: 2024-05-07

## 2024-05-07 VITALS
WEIGHT: 102.75 LBS | HEIGHT: 63 IN | RESPIRATION RATE: 18 BRPM | SYSTOLIC BLOOD PRESSURE: 160 MMHG | BODY MASS INDEX: 18.21 KG/M2 | DIASTOLIC BLOOD PRESSURE: 100 MMHG | HEART RATE: 69 BPM

## 2024-05-07 VITALS — HEIGHT: 63 IN | WEIGHT: 102.75 LBS | BODY MASS INDEX: 18.21 KG/M2

## 2024-05-07 DIAGNOSIS — B35.0 TINEA CAPITIS: ICD-10-CM

## 2024-05-07 DIAGNOSIS — L21.9 SEBORRHEIC DERMATITIS: Primary | ICD-10-CM

## 2024-05-07 DIAGNOSIS — N02.B9 IGA NEPHROPATHY: Primary | ICD-10-CM

## 2024-05-07 PROCEDURE — G2211 COMPLEX E/M VISIT ADD ON: HCPCS | Mod: S$PBB,,, | Performed by: STUDENT IN AN ORGANIZED HEALTH CARE EDUCATION/TRAINING PROGRAM

## 2024-05-07 PROCEDURE — 99215 OFFICE O/P EST HI 40 MIN: CPT | Mod: S$PBB,,, | Performed by: INTERNAL MEDICINE

## 2024-05-07 PROCEDURE — 99999 PR PBB SHADOW E&M-EST. PATIENT-LVL IV: CPT | Mod: PBBFAC,,, | Performed by: INTERNAL MEDICINE

## 2024-05-07 PROCEDURE — 99204 OFFICE O/P NEW MOD 45 MIN: CPT | Mod: S$PBB,,, | Performed by: STUDENT IN AN ORGANIZED HEALTH CARE EDUCATION/TRAINING PROGRAM

## 2024-05-07 PROCEDURE — 99214 OFFICE O/P EST MOD 30 MIN: CPT | Mod: PBBFAC,27 | Performed by: INTERNAL MEDICINE

## 2024-05-07 PROCEDURE — 99214 OFFICE O/P EST MOD 30 MIN: CPT | Mod: PBBFAC | Performed by: STUDENT IN AN ORGANIZED HEALTH CARE EDUCATION/TRAINING PROGRAM

## 2024-05-07 PROCEDURE — 99999 PR PBB SHADOW E&M-EST. PATIENT-LVL IV: CPT | Mod: PBBFAC,,, | Performed by: STUDENT IN AN ORGANIZED HEALTH CARE EDUCATION/TRAINING PROGRAM

## 2024-05-07 RX ORDER — KETOCONAZOLE 20 MG/ML
SHAMPOO, SUSPENSION TOPICAL
Qty: 120 ML | Refills: 6 | Status: SHIPPED | OUTPATIENT
Start: 2024-05-09

## 2024-05-07 RX ORDER — CLOBETASOL PROPIONATE 0.46 MG/ML
SOLUTION TOPICAL DAILY
Qty: 50 ML | Refills: 3 | Status: SHIPPED | OUTPATIENT
Start: 2024-05-07

## 2024-05-07 RX ORDER — LISINOPRIL 20 MG/1
20 TABLET ORAL DAILY
Qty: 90 TABLET | Refills: 3 | Status: SHIPPED | OUTPATIENT
Start: 2024-05-07 | End: 2024-05-30

## 2024-05-07 NOTE — PROGRESS NOTES
Renal clinic f/u note:  Date of clinic visit: 5/7/24  Reason for f/u and chief c/o: h/o of IgA nephropathy and renal failure     HPI: Pt is a 49 y/o  female with a h/o of kidney biopsy proven IgA nephropathy who presents for f/u. Pt initially presented with JODI/renal failure and nephrotic syndrome in Nov 2020. Pt is s/p high dose prednisone treatment  (Nov 2020 to March 2021) with good response. Pt has also received ACE-I therapy, omega-3-fatty acids, and statins. Pt was last seen in Nov 2023. Chart was reviewed. On her last visit, pt had been treated for UTI by PCP and K was elevated (unclear what abx was used, may have used bactrim). Lisinopril was held, which pt has not taken since then.    On f/u today, pt feels well. No new c/o's, no blood in urine, no SOB, no leg swelling. No sx's of UTI reported. At the end of the visit, pt asked me to look at her hair line on her neck. She has a spot that itches. Pt has been putting hydrocortisone cream on it.        To review: Kidney biopsy showed about 25% chronic interstitial fibrosis. Importantly there was no sign of any crescents, however sample size was small.        PAST MEDICAL HISTORY:  IgA nephropathy, kidney biopsy proven 11/4/20 (on high dose prednisone Nov 2020 to March 2021, then slow taper until May 2021), Hypertension, hypercholestrolemia.     PAST SURGICAL HISTORY:  She  has no past surgical history on file.     SOCIAL HISTORY:  She  reports that she has never smoked. She does not have any smokeless tobacco history on file. She reports that she does not drink alcohol or use drugs.     FAMILY MEDICAL HISTORY:  Her family history is not on file.     Review of patient's allergies indicates:  No Known Allergies     Meds reviewed     Current Outpatient Medications:     chlorthalidone (HYGROTEN) 25 MG Tab, Take 1 tablet (25 mg total) by mouth once daily. Take 1/2 (12.5 mg) po qd, Disp: 45 tablet, Rfl: 3    lovastatin (MEVACOR) 40 MG tablet, Take 1 tablet  (40 mg total) by mouth every evening., Disp: 30 tablet, Rfl: 0    metoprolol tartrate (LOPRESSOR) 25 MG tablet, TAKE 1 TABLET BY MOUTH TWICE A DAY, Disp: 180 tablet, Rfl: 4    omega-3 fatty acids/fish oil (FISH OIL-OMEGA-3 FATTY ACIDS) 300-1,000 mg capsule, Take 1 capsule by mouth once daily., Disp: , Rfl:     clobetasoL (TEMOVATE) 0.05 % external solution, Apply topically once daily., Disp: 50 mL, Rfl: 3    [START ON 5/9/2024] ketoconazole (NIZORAL) 2 % shampoo, Apply topically twice a week., Disp: 120 mL, Rfl: 6     REVIEW OF SYSTEMS:  Patient has no fever, fatigue, visual changes, chest pain, edema, cough, dyspnea, nausea, vomiting, constipation, diarrhea, arthralgias, pruritis, dizziness, weakness, depression, confusion.     PHYSICAL EXAM:  Blood pressure 166/100, repeat 155/90, pulse 69, weight 46.6 Kg, from 47.0 Kg  Gen:    WDWN female in no apparent distress  Psych: Normal mood and affect  Skin:    prior leukocytoclastic vasculitis type rash on the LE's have disappeared  No facial edema   Neck:   No JVD  Chest:  Clear with no rales, rhonchi, wheezing with normal effort  CV:      Regular with no murmurs, gallops or rubs  Abd:     Soft, nontender, no distension  Ext:      No edema  Hair line at neck shows ringworm     Labs   BMP  Lab Results   Component Value Date     04/30/2024    K 3.9 04/30/2024     04/30/2024    CO2 29 04/30/2024    BUN 41 (H) 04/30/2024    CREATININE 1.5 (H) 04/30/2024    CALCIUM 9.7 04/30/2024    ANIONGAP 8 04/30/2024    EGFRNORACEVR 42.2 (A) 04/30/2024     Lab Results   Component Value Date    WBC 4.77 04/30/2024    HGB 13.2 04/30/2024    HCT 40.9 04/30/2024    MCV 92 04/30/2024     04/30/2024          U/a: trace protein, 1+ blood   Urine prot/Cr 0.44 g, last visit 0.24 g, from 0.11 g, from 0.11 g, from 0.39 g, from 1.3 g from 2.4 g max        IMPRESSION AND RECOMMENDATIONS: 49 y/o  female with kidney biopsy proven IgA nephropathy presents for f/u:     1.  Renal: s Cr stable, at baseline  Stable renal function, CKD stage 3  H/o of kidney biopsy proven IgA nephropathy  Excellent prior response to treatment with high dose prednisone. JODI resolved and subnephrotic range proteinuria improved  Pt is clinically well and improved and stable.   No sign of relapse today  S/p high dose prednisone form Nov 2020 to March 2021, then slow taper until May 2021, prednisone (5 mg) was stopped in Nov 2023  On statins, and good quality fish oil  ACE-I on hold since Nov 2023 due to hyperkalemia     Has some residual renal damage, CKD stage 3  Proteinuria, in microalbuminuric range. No longer sub-nephrotic  Microscopic hematuria, has improved overall  BP not controlled today, lisinopril has been on hold      Side effects of prednisone: have improved/resolved after dose reduction  Edema, easy bruising, HTN: all improved.     Hyperkalemia. Has resolved  May have been due to bactrim used to treat UTI     The following are recommended   -re-start lisinopril 20 mg po qd   - continue metoprolol  -continue chlorthalidone  -continue omega-3 fatty acids (good quality fish oil) qd  -continue statins  -RTC in 1 month      2. Lesion at hair line on the neck:  Exam suggests ringworm (tinea capitis)  Advised pt to keep area dry, keep hair away  Azole creams and shampoo  Dermatology referral        Plans and recommendations:  As discussed above.  Total time spent 40 minutes including time needed to review the records, the   patient evaluation, documentation, face-to-face discussion with the patient,   more than 50% of the time was spent on coordination of care and counseling.    Level V visit.     Néstor Flores MD

## 2024-05-07 NOTE — PROGRESS NOTES
Subjective:       Patient ID:  Luisa Aguilar is a 48 y.o. female who presents for   Chief Complaint   Patient presents with    Rash    Itching     Pt visit for itchy burning rash back of neck on hair line , flaky ad dry irritation      History of Present Illness: The patient presents with chief complaint of a persistent rash, possible ring worm.  Location: back of the neck and scalp  Duration: ongoing for several months  Signs/Symptoms: reports having reddish, scaly and itchy rash on the back of the neck and scalp.   Prior treatments: has tried nizoral shampoo, but unsure if it was ketoconazole or not. Otherwise, no other treatments. No other similar rash elsewhere.       Rash    Itching        Review of Systems   Constitutional:  Negative for fever and chills.   Skin:  Positive for itching and rash.        Objective:    Physical Exam   Constitutional: She appears well-developed and well-nourished. No distress.   Neurological: She is alert and oriented to person, place, and time. She is not disoriented.   Psychiatric: She has a normal mood and affect.   Skin:   Areas Examined (abnormalities noted in diagram):   Scalp / Hair Palpated and Inspected  Head / Face Inspection Performed  Neck Inspection Performed              Diagram Legend     Erythematous scaling macule/papule c/w actinic keratosis       Vascular papule c/w angioma      Pigmented verrucoid papule/plaque c/w seborrheic keratosis      Yellow umbilicated papule c/w sebaceous hyperplasia      Irregularly shaped tan macule c/w lentigo     1-2 mm smooth white papules consistent with Milia      Movable subcutaneous cyst with punctum c/w epidermal inclusion cyst      Subcutaneous movable cyst c/w pilar cyst      Firm pink to brown papule c/w dermatofibroma      Pedunculated fleshy papule(s) c/w skin tag(s)      Evenly pigmented macule c/w junctional nevus     Mildly variegated pigmented, slightly irregular-bordered macule c/w mildly atypical nevus       Flesh colored to evenly pigmented papule c/w intradermal nevus       Pink pearly papule/plaque c/w basal cell carcinoma      Erythematous hyperkeratotic cursted plaque c/w SCC      Surgical scar with no sign of skin cancer recurrence      Open and closed comedones      Inflammatory papules and pustules      Verrucoid papule consistent consistent with wart     Erythematous eczematous patches and plaques     Dystrophic onycholytic nail with subungual debris c/w onychomycosis     Umbilicated papule    Erythematous-base heme-crusted tan verrucoid plaque consistent with inflamed seborrheic keratosis     Erythematous Silvery Scaling Plaque c/w Psoriasis     See annotation      Assessment / Plan:        Seborrheic dermatitis  -     ketoconazole (NIZORAL) 2 % shampoo; Apply topically twice a week.  Dispense: 120 mL; Refill: 6  -     clobetasoL (TEMOVATE) 0.05 % external solution; Apply topically once daily.  Dispense: 50 mL; Refill: 3           Follow up in about 3 months (around 8/7/2024).

## 2024-05-22 ENCOUNTER — LAB VISIT (OUTPATIENT)
Dept: LAB | Facility: HOSPITAL | Age: 49
End: 2024-05-22
Attending: INTERNAL MEDICINE

## 2024-05-22 DIAGNOSIS — N02.B9 IGA NEPHROPATHY: ICD-10-CM

## 2024-05-22 LAB
CREAT UR-MCNC: 56 MG/DL (ref 15–325)
PROT UR-MCNC: 12 MG/DL (ref 0–15)
PROT/CREAT UR: 0.21 MG/G{CREAT} (ref 0–0.2)

## 2024-05-22 PROCEDURE — 84156 ASSAY OF PROTEIN URINE: CPT | Performed by: INTERNAL MEDICINE

## 2024-05-30 ENCOUNTER — OFFICE VISIT (OUTPATIENT)
Dept: NEPHROLOGY | Facility: CLINIC | Age: 49
End: 2024-05-30

## 2024-05-30 VITALS
RESPIRATION RATE: 18 BRPM | DIASTOLIC BLOOD PRESSURE: 100 MMHG | BODY MASS INDEX: 17.75 KG/M2 | SYSTOLIC BLOOD PRESSURE: 140 MMHG | HEART RATE: 72 BPM | WEIGHT: 100.19 LBS | HEIGHT: 63 IN

## 2024-05-30 DIAGNOSIS — N02.B9 IGA NEPHROPATHY: ICD-10-CM

## 2024-05-30 DIAGNOSIS — I10 PRIMARY HYPERTENSION: Primary | ICD-10-CM

## 2024-05-30 PROCEDURE — 99999 PR PBB SHADOW E&M-EST. PATIENT-LVL III: CPT | Mod: PBBFAC,,, | Performed by: INTERNAL MEDICINE

## 2024-05-30 PROCEDURE — 99213 OFFICE O/P EST LOW 20 MIN: CPT | Mod: PBBFAC | Performed by: INTERNAL MEDICINE

## 2024-05-30 PROCEDURE — 99215 OFFICE O/P EST HI 40 MIN: CPT | Mod: S$PBB,,, | Performed by: INTERNAL MEDICINE

## 2024-05-30 RX ORDER — METOPROLOL TARTRATE 25 MG/1
12.5 TABLET, FILM COATED ORAL 2 TIMES DAILY
Qty: 90 TABLET | Refills: 4 | Status: SHIPPED | OUTPATIENT
Start: 2024-05-30

## 2024-05-30 RX ORDER — LISINOPRIL 10 MG/1
10 TABLET ORAL DAILY
Qty: 90 TABLET | Refills: 3 | Status: SHIPPED | OUTPATIENT
Start: 2024-05-30 | End: 2025-05-30

## 2024-05-30 NOTE — PROGRESS NOTES
Renal clinic f/u note:  Date of clinic visit: 5/30/24  Reason for f/u and chief c/o: uncontrolled BP/HTN, h/o of IgA nephropathy and renal failure     HPI: Pt is a 49 y/o  female with a h/o of kidney biopsy proven IgA nephropathy who presents for f/u. She was last seen in renal clinic about 1 month ago. BP was elevated. Lisinopril had been on hod due to prior hyperkalemia. K had remained normal. Lisinopril was re-started 20 mg qd. Pt present for f/u. Pt says she is feeling well, lisinopril made her fee tired and her BP dropped too low. Pt brought her BP readings from home. Most SBP's were around 100. Pt took lisinopril x 3 days, then stopped    To review other issues, pt initially presented with JODI/renal failure and nephrotic syndrome in Nov 2020. Pt is s/p high dose prednisone treatment  (Nov 2020 to March 2021) with good response. Pt has also received ACE-I therapy, omega-3-fatty acids, and statins.          To review: Kidney biopsy showed about 25% chronic interstitial fibrosis. Importantly there was no sign of any crescents, however sample size was small.        PAST MEDICAL HISTORY:  IgA nephropathy, kidney biopsy proven 11/4/20 (on high dose prednisone Nov 2020 to March 2021, then slow taper until May 2021), Hypertension, hypercholestrolemia.     PAST SURGICAL HISTORY:  She  has no past surgical history on file.     SOCIAL HISTORY:  She  reports that she has never smoked. She does not have any smokeless tobacco history on file. She reports that she does not drink alcohol or use drugs.     FAMILY MEDICAL HISTORY:  Her family history is not on file.     Review of patient's allergies indicates:  No Known Allergies     Meds reviewed     Current Outpatient Medications:     chlorthalidone (HYGROTEN) 25 MG Tab, Take 1 tablet (25 mg total) by mouth once daily. Take 1/2 (12.5 mg) po qd, Disp: 45 tablet, Rfl: 3    lovastatin (MEVACOR) 40 MG tablet, Take 1 tablet (40 mg total) by mouth every evening., Disp: 30  tablet, Rfl: 0    metoprolol tartrate (LOPRESSOR) 25 MG tablet, TAKE 1 TABLET BY MOUTH TWICE A DAY, Disp: 180 tablet, Rfl: 4    omega-3 fatty acids/fish oil (FISH OIL-OMEGA-3 FATTY ACIDS) 300-1,000 mg capsule, Take 1 capsule by mouth once daily., Disp: , Rfl:     clobetasoL (TEMOVATE) 0.05 % external solution, Apply topically once daily., Disp: 50 mL, Rfl: 3    [START ON 5/9/2024] ketoconazole (NIZORAL) 2 % shampoo, Apply topically twice a week., Disp: 120 mL, Rfl: 6     REVIEW OF SYSTEMS:  Patient has no fever, fatigue, visual changes, chest pain, edema, cough, dyspnea, nausea, vomiting, constipation, diarrhea, arthralgias, pruritis, dizziness, weakness, depression, confusion.     PHYSICAL EXAM:  Blood pressure 140/100, repeat 140/95, pulse 69, weight 45.5 Kg, from 46.6 Kg  Gen:    WDWN female in no apparent distress  Psych: Normal mood and affect  Skin:    prior leukocytoclastic vasculitis type rash on the LE's have disappeared  No facial edema   Neck:   No JVD  Chest:  Clear with no rales, rhonchi, wheezing with normal effort  CV:      Regular with no murmurs, gallops or rubs  Abd:     Soft, nontender, no distension  Ext:      No edema     Labs reviewed  BMP  Lab Results   Component Value Date     05/22/2024    K 4.2 05/22/2024     05/22/2024    CO2 26 05/22/2024    BUN 54 (H) 05/22/2024    CREATININE 1.7 (H) 05/22/2024    CALCIUM 9.8 05/22/2024    ANIONGAP 8 05/22/2024    EGFRNORACEVR 36.3 (A) 05/22/2024     Lab Results   Component Value Date    WBC 4.32 05/22/2024    HGB 12.9 05/22/2024    HCT 40.3 05/22/2024    MCV 91 05/22/2024     05/22/2024                  U/a: trace protein, 1+ blood   Urine prot/Cr 0.21 g, from 0.44 g, last visit 0.24 g, from 0.11 g, from 0.11 g, from 0.39 g, from 1.3 g from 2.4 g max        IMPRESSION AND RECOMMENDATIONS: 49 y/o  female with kidney biopsy proven IgA nephropathy presents for f/u:     1. Renal: s Cr stable, at baseline. Has mild  overdiuresis  Stable renal function, CKD stage 3  H/o of kidney biopsy proven IgA nephropathy  Excellent prior response to treatment with high dose prednisone. JODI resolved and subnephrotic range proteinuria improved  Pt is clinically well and improved and stable.   No sign of relapse  S/p high dose prednisone form Nov 2020 to March 2021, then slow taper until May 2021, prednisone (5 mg) was stopped in Nov 2023  On statins, and good quality fish oil  ACE-I on hold since Nov 2023 due to hyperkalemia. Was restarted 1 month ago.     Has some residual renal damage, CKD stage 3  Proteinuria, in microalbuminuric range. No longer sub-nephrotic  Microscopic hematuria, has improved overall  BP not controlled today, lisinopril has been on hold      Side effects of prednisone: have improved/resolved after dose reduction  Edema, easy bruising, HTN: all improved.     Hyperkalemia. Has resolved  May have been due to bactrim used to treat UTI     2. HTN: DBP is high.  Pt is not tolerating 20 mg dose of lisinopril  Has mild overdiuresis  The following are recommended   -re-start lisinopril but at lower dose of 10 mg po qd   - continue metoprolol but at lower dose of 12.5 mg po bid  -d/c chlorthalidone  -continue omega-3 fatty acids (good quality fish oil) qd  -continue statins  -RTC in 4 months            Plans and recommendations:  As discussed above.  Total time spent 40 minutes including time needed to review the records, the   patient evaluation, documentation, face-to-face discussion with the patient,   more than 50% of the time was spent on coordination of care and counseling.    Level V visit.     Néstor Flores MD

## 2024-06-03 RX ORDER — LOVASTATIN 40 MG/1
40 TABLET ORAL NIGHTLY
Qty: 30 TABLET | Refills: 0 | Status: SHIPPED | OUTPATIENT
Start: 2024-06-03

## 2024-06-03 RX ORDER — PREDNISONE 5 MG/1
TABLET ORAL
Qty: 30 TABLET | Refills: 11 | Status: SHIPPED | OUTPATIENT
Start: 2024-06-03

## 2024-07-01 RX ORDER — LOVASTATIN 40 MG/1
40 TABLET ORAL NIGHTLY
Qty: 90 TABLET | Refills: 1 | Status: SHIPPED | OUTPATIENT
Start: 2024-07-01

## 2024-09-10 ENCOUNTER — LAB VISIT (OUTPATIENT)
Dept: LAB | Facility: HOSPITAL | Age: 49
End: 2024-09-10
Attending: INTERNAL MEDICINE

## 2024-09-10 DIAGNOSIS — N02.B9 IGA NEPHROPATHY: ICD-10-CM

## 2024-09-10 DIAGNOSIS — I10 PRIMARY HYPERTENSION: ICD-10-CM

## 2024-09-10 LAB
ALBUMIN SERPL BCP-MCNC: 3.8 G/DL (ref 3.5–5.2)
ANION GAP SERPL CALC-SCNC: 7 MMOL/L (ref 8–16)
BASOPHILS # BLD AUTO: 0.03 K/UL (ref 0–0.2)
BASOPHILS NFR BLD: 0.7 % (ref 0–1.9)
BUN SERPL-MCNC: 43 MG/DL (ref 6–20)
CALCIUM SERPL-MCNC: 9.6 MG/DL (ref 8.7–10.5)
CHLORIDE SERPL-SCNC: 108 MMOL/L (ref 95–110)
CO2 SERPL-SCNC: 23 MMOL/L (ref 23–29)
CREAT SERPL-MCNC: 1.7 MG/DL (ref 0.5–1.4)
DIFFERENTIAL METHOD BLD: ABNORMAL
EOSINOPHIL # BLD AUTO: 0.2 K/UL (ref 0–0.5)
EOSINOPHIL NFR BLD: 4.3 % (ref 0–8)
ERYTHROCYTE [DISTWIDTH] IN BLOOD BY AUTOMATED COUNT: 12.4 % (ref 11.5–14.5)
EST. GFR  (NO RACE VARIABLE): 36.3 ML/MIN/1.73 M^2
GLUCOSE SERPL-MCNC: 84 MG/DL (ref 70–110)
HCT VFR BLD AUTO: 37.2 % (ref 37–48.5)
HGB BLD-MCNC: 12.2 G/DL (ref 12–16)
IMM GRANULOCYTES # BLD AUTO: 0.01 K/UL (ref 0–0.04)
IMM GRANULOCYTES NFR BLD AUTO: 0.2 % (ref 0–0.5)
LYMPHOCYTES # BLD AUTO: 1.2 K/UL (ref 1–4.8)
LYMPHOCYTES NFR BLD: 29.9 % (ref 18–48)
MCH RBC QN AUTO: 30.6 PG (ref 27–31)
MCHC RBC AUTO-ENTMCNC: 32.8 G/DL (ref 32–36)
MCV RBC AUTO: 93 FL (ref 82–98)
MONOCYTES # BLD AUTO: 0.3 K/UL (ref 0.3–1)
MONOCYTES NFR BLD: 6 % (ref 4–15)
NEUTROPHILS # BLD AUTO: 2.4 K/UL (ref 1.8–7.7)
NEUTROPHILS NFR BLD: 58.9 % (ref 38–73)
NRBC BLD-RTO: 0 /100 WBC
PHOSPHATE SERPL-MCNC: 4.2 MG/DL (ref 2.7–4.5)
PLATELET # BLD AUTO: 190 K/UL (ref 150–450)
PMV BLD AUTO: 11.8 FL (ref 9.2–12.9)
POTASSIUM SERPL-SCNC: 5.1 MMOL/L (ref 3.5–5.1)
RBC # BLD AUTO: 3.99 M/UL (ref 4–5.4)
SODIUM SERPL-SCNC: 138 MMOL/L (ref 136–145)
WBC # BLD AUTO: 4.15 K/UL (ref 3.9–12.7)

## 2024-09-10 PROCEDURE — 36415 COLL VENOUS BLD VENIPUNCTURE: CPT | Performed by: INTERNAL MEDICINE

## 2024-09-10 PROCEDURE — 85025 COMPLETE CBC W/AUTO DIFF WBC: CPT | Performed by: INTERNAL MEDICINE

## 2024-09-10 PROCEDURE — 80069 RENAL FUNCTION PANEL: CPT | Performed by: INTERNAL MEDICINE

## 2024-09-17 ENCOUNTER — OFFICE VISIT (OUTPATIENT)
Dept: NEPHROLOGY | Facility: CLINIC | Age: 49
End: 2024-09-17

## 2024-09-17 ENCOUNTER — TELEPHONE (OUTPATIENT)
Dept: NEPHROLOGY | Facility: CLINIC | Age: 49
End: 2024-09-17

## 2024-09-17 VITALS
DIASTOLIC BLOOD PRESSURE: 82 MMHG | WEIGHT: 103.81 LBS | HEART RATE: 68 BPM | SYSTOLIC BLOOD PRESSURE: 128 MMHG | HEIGHT: 65 IN | BODY MASS INDEX: 17.3 KG/M2

## 2024-09-17 DIAGNOSIS — N02.B9 IGA NEPHROPATHY: ICD-10-CM

## 2024-09-17 PROCEDURE — 99999 PR PBB SHADOW E&M-EST. PATIENT-LVL III: CPT | Mod: PBBFAC,,, | Performed by: INTERNAL MEDICINE

## 2024-09-17 PROCEDURE — 99213 OFFICE O/P EST LOW 20 MIN: CPT | Mod: PBBFAC | Performed by: INTERNAL MEDICINE

## 2024-09-17 PROCEDURE — 99215 OFFICE O/P EST HI 40 MIN: CPT | Mod: S$PBB,,, | Performed by: INTERNAL MEDICINE

## 2024-09-17 RX ORDER — LISINOPRIL 10 MG/1
10 TABLET ORAL DAILY
Qty: 90 TABLET | Refills: 3 | Status: SHIPPED | OUTPATIENT
Start: 2024-09-17 | End: 2025-09-17

## 2024-09-17 RX ORDER — METOPROLOL TARTRATE 25 MG/1
12.5 TABLET, FILM COATED ORAL 2 TIMES DAILY
Qty: 90 TABLET | Refills: 4 | Status: SHIPPED | OUTPATIENT
Start: 2024-09-17

## 2024-09-17 RX ORDER — HYDROCHLOROTHIAZIDE 12.5 MG/1
12.5 TABLET ORAL EVERY OTHER DAY
Qty: 45 TABLET | Refills: 3 | Status: SHIPPED | OUTPATIENT
Start: 2024-09-17 | End: 2025-09-17

## 2024-09-17 NOTE — PROGRESS NOTES
Renal clinic f/u note:  Date of clinic visit: 9/17/24  Reason for f/u and chief c/o: uncontrolled BP/HTN, h/o of IgA nephropathy and renal failure     HPI: Pt is a 49 y/o  female with a h/o of kidney biopsy proven IgA nephropathy who presents for f/u. She was last seen in renal clinic 2 months ago. Pt presents sooner than her usual for BP control issues. Previously, lisinopril was switched back and forth several times due to low BP and hyperkalemia issues. On her last visit, lisinopril was set at 10 mg po qd. On this visit, pt reported that her BP was not controlled, and she raised the dose on her own to bid. She has no c/o's today, no leg swelling, no blood in urine.     To review other issues, pt initially presented with JODI/renal failure and nephrotic syndrome in Nov 2020. Pt is s/p high dose prednisone treatment  (Nov 2020 to March 2021) with good response. Pt has also received ACE-I therapy, omega-3-fatty acids, and statins.          To review: Kidney biopsy showed about 25% chronic interstitial fibrosis. Importantly there was no sign of any crescents, however sample size was small.        PAST MEDICAL HISTORY:  IgA nephropathy, kidney biopsy proven 11/4/20 (on high dose prednisone Nov 2020 to March 2021, then slow taper until May 2021), Hypertension, hypercholestrolemia.     PAST SURGICAL HISTORY:  She  has no past surgical history on file.     SOCIAL HISTORY:  She  reports that she has never smoked. She does not have any smokeless tobacco history on file. She reports that she does not drink alcohol or use drugs.     FAMILY MEDICAL HISTORY:  Her family history is not on file.     Review of patient's allergies indicates:  No Known Allergies     Meds reviewed     Current Outpatient Medications:     lovastatin (MEVACOR) 40 MG tablet, TAKE 1 TABLET BY MOUTH EVERY DAY IN THE EVENING, Disp: 90 tablet, Rfl: 1    omega-3 fatty acids/fish oil (FISH OIL-OMEGA-3 FATTY ACIDS) 300-1,000 mg capsule, Take 1 capsule  by mouth once daily., Disp: , Rfl:     clobetasoL (TEMOVATE) 0.05 % external solution, Apply topically once daily., Disp: 50 mL, Rfl: 3    ketoconazole (NIZORAL) 2 % shampoo, Apply topically twice a week., Disp: 120 mL, Rfl: 6    lisinopriL 10 MG tablet, po bid    metoprolol tartrate (LOPRESSOR) 25 MG tablet, Take 0.5 tablets (12.5 mg total) by mouth 2 (two) times daily., Disp: 90 tablet, Rfl: 4     REVIEW OF SYSTEMS:  Patient has no fever, fatigue, visual changes, chest pain, edema, cough, dyspnea, nausea, vomiting, constipation, diarrhea, arthralgias, pruritis, dizziness, weakness, depression, confusion.     PHYSICAL EXAM:  Blood pressure 1128/82, pulse 68, weight 47.1 Kg, from 45.5 Kg, from 46.6 Kg  Gen:    WDWN female in no apparent distress  Psych: Normal mood and affect  Skin:    prior leukocytoclastic vasculitis type rash on the LE's have disappeared  No facial edema   Neck:   No JVD  Chest:  Clear with no rales, rhonchi, wheezing with normal effort  CV:      Regular with no murmurs, gallops or rubs  Abd:     Soft, nontender, no distension  Ext:      No edema     Labs reviewed  BMP  Lab Results   Component Value Date     09/10/2024    K 5.1 09/10/2024     09/10/2024    CO2 23 09/10/2024    BUN 43 (H) 09/10/2024    CREATININE 1.7 (H) 09/10/2024    CALCIUM 9.6 09/10/2024    ANIONGAP 7 (L) 09/10/2024    EGFRNORACEVR 36.3 (A) 09/10/2024     Lab Results   Component Value Date    WBC 4.15 09/10/2024    HGB 12.2 09/10/2024    HCT 37.2 09/10/2024    MCV 93 09/10/2024     09/10/2024       Lab Results   Component Value Date    .0 (H) 01/08/2021    CALCIUM 9.6 09/10/2024    PHOS 4.2 09/10/2024           U/a: no protein, 2+ blood   Urine prot/Cr 0.20 g, from 0.21 g, from 0.44 g, last visit 0.24 g, from 0.11 g, from 0.11 g, from 0.39 g, from 1.3 g from 2.4 g max        IMPRESSION AND RECOMMENDATIONS: 49 y/o  female with kidney biopsy proven IgA nephropathy presents for f/u:     1. Renal:  s Cr stable, at baseline.  Stable renal function, CKD stage 3  H/o of kidney biopsy proven IgA nephropathy  Excellent prior response to treatment with high dose prednisone. JODI resolved and subnephrotic range proteinuria improved  Pt is clinically well and improved and stable.   Remains in remission. No sign of relapse  S/p high dose prednisone from Nov 2020 to March 2021, then slow taper until May 2021, prednisone (5 mg) was stopped in Nov 2023  On statins, and good quality fish oil    Has some residual renal damage, CKD stage 3  Proteinuria, in microalbuminuric range. No longer sub-nephrotic  Microscopic hematuria, has improved overall     Side effects of prednisone: have resolved   Edema, easy bruising, HTN: all improved and resolved    Continue ACE-I, static, good quality fish oil      2. HTN: controlled in clinic today  On lisinopril 10 mg bid, was prescribed qd.  Has mild hyperkalemia: lower lisinopril to 10 mg qd  Will add HCTZ 12.5 mg po qod to control K              Plans and recommendations:  As discussed above.  Total time spent 40 minutes including time needed to review the records, the   patient evaluation, documentation, face-to-face discussion with the patient,   more than 50% of the time was spent on coordination of care and counseling.    Level V visit.  RTC 6 months     Néstor Flores MD

## 2025-03-12 ENCOUNTER — LAB VISIT (OUTPATIENT)
Dept: LAB | Facility: HOSPITAL | Age: 50
End: 2025-03-12
Attending: INTERNAL MEDICINE

## 2025-03-12 DIAGNOSIS — N02.B9 IGA NEPHROPATHY: ICD-10-CM

## 2025-03-12 LAB
ALBUMIN SERPL BCP-MCNC: 4.1 G/DL (ref 3.5–5.2)
ANION GAP SERPL CALC-SCNC: 12 MMOL/L (ref 8–16)
BASOPHILS # BLD AUTO: 0.04 K/UL (ref 0–0.2)
BASOPHILS NFR BLD: 0.9 % (ref 0–1.9)
BUN SERPL-MCNC: 57 MG/DL (ref 6–20)
CALCIUM SERPL-MCNC: 9.6 MG/DL (ref 8.7–10.5)
CHLORIDE SERPL-SCNC: 107 MMOL/L (ref 95–110)
CO2 SERPL-SCNC: 19 MMOL/L (ref 23–29)
CREAT SERPL-MCNC: 1.8 MG/DL (ref 0.5–1.4)
DIFFERENTIAL METHOD BLD: ABNORMAL
DOHLE BOD BLD QL SMEAR: PRESENT
EOSINOPHIL # BLD AUTO: 0.2 K/UL (ref 0–0.5)
EOSINOPHIL NFR BLD: 3.4 % (ref 0–8)
ERYTHROCYTE [DISTWIDTH] IN BLOOD BY AUTOMATED COUNT: 12.1 % (ref 11.5–14.5)
EST. GFR  (NO RACE VARIABLE): 33.7 ML/MIN/1.73 M^2
GLUCOSE SERPL-MCNC: 89 MG/DL (ref 70–110)
HCT VFR BLD AUTO: 39.6 % (ref 37–48.5)
HGB BLD-MCNC: 12.6 G/DL (ref 12–16)
IMM GRANULOCYTES # BLD AUTO: 0.01 K/UL (ref 0–0.04)
IMM GRANULOCYTES NFR BLD AUTO: 0.2 % (ref 0–0.5)
LYMPHOCYTES # BLD AUTO: 1.3 K/UL (ref 1–4.8)
LYMPHOCYTES NFR BLD: 30.2 % (ref 18–48)
MCH RBC QN AUTO: 30.5 PG (ref 27–31)
MCHC RBC AUTO-ENTMCNC: 31.8 G/DL (ref 32–36)
MCV RBC AUTO: 96 FL (ref 82–98)
MONOCYTES # BLD AUTO: 0.3 K/UL (ref 0.3–1)
MONOCYTES NFR BLD: 6.8 % (ref 4–15)
NEUTROPHILS # BLD AUTO: 2.6 K/UL (ref 1.8–7.7)
NEUTROPHILS NFR BLD: 58.5 % (ref 38–73)
NRBC BLD-RTO: 1 /100 WBC
PHOSPHATE SERPL-MCNC: 4.6 MG/DL (ref 2.7–4.5)
PLATELET # BLD AUTO: 163 K/UL (ref 150–450)
PLATELET BLD QL SMEAR: ABNORMAL
PMV BLD AUTO: 11.9 FL (ref 9.2–12.9)
POTASSIUM SERPL-SCNC: 5.2 MMOL/L (ref 3.5–5.1)
RBC # BLD AUTO: 4.13 M/UL (ref 4–5.4)
SMUDGE CELLS BLD QL SMEAR: PRESENT
SODIUM SERPL-SCNC: 138 MMOL/L (ref 136–145)
WBC # BLD AUTO: 4.44 K/UL (ref 3.9–12.7)

## 2025-03-12 PROCEDURE — 80069 RENAL FUNCTION PANEL: CPT | Performed by: INTERNAL MEDICINE

## 2025-03-12 PROCEDURE — 36415 COLL VENOUS BLD VENIPUNCTURE: CPT | Performed by: INTERNAL MEDICINE

## 2025-03-12 PROCEDURE — 85025 COMPLETE CBC W/AUTO DIFF WBC: CPT | Performed by: INTERNAL MEDICINE

## 2025-03-19 ENCOUNTER — OFFICE VISIT (OUTPATIENT)
Dept: NEPHROLOGY | Facility: CLINIC | Age: 50
End: 2025-03-19

## 2025-03-19 ENCOUNTER — TELEPHONE (OUTPATIENT)
Dept: NEPHROLOGY | Facility: CLINIC | Age: 50
End: 2025-03-19

## 2025-03-19 VITALS
RESPIRATION RATE: 18 BRPM | HEART RATE: 62 BPM | WEIGHT: 106.94 LBS | DIASTOLIC BLOOD PRESSURE: 90 MMHG | HEIGHT: 65 IN | BODY MASS INDEX: 17.82 KG/M2 | SYSTOLIC BLOOD PRESSURE: 132 MMHG

## 2025-03-19 DIAGNOSIS — N02.B9 IGA NEPHROPATHY: Primary | ICD-10-CM

## 2025-03-19 PROCEDURE — 99999 PR PBB SHADOW E&M-EST. PATIENT-LVL III: CPT | Mod: PBBFAC,,, | Performed by: INTERNAL MEDICINE

## 2025-03-19 PROCEDURE — 99215 OFFICE O/P EST HI 40 MIN: CPT | Mod: S$PBB,,, | Performed by: INTERNAL MEDICINE

## 2025-03-19 PROCEDURE — 99213 OFFICE O/P EST LOW 20 MIN: CPT | Mod: PBBFAC | Performed by: INTERNAL MEDICINE

## 2025-03-19 NOTE — TELEPHONE ENCOUNTER
----- Message from Trixie sent at 3/19/2025  1:15 PM CDT -----  Regarding: Call Back  Contact: patient  Type:  Patient Call BackWho Called: Vanessa Walton Message for Patient: patient Does the patient know what this is regarding?: medication Would the patient rather a call back or a response via [facility]? Call back Best Call Back Number: [phone] Additional Information: new medication, cannot afford it or can she keep the same medication or reschedule the appointment.  The caller would like to have something cheaper if possible.Thanks,SJ

## 2025-03-19 NOTE — PROGRESS NOTES
Renal clinic f/u note:  Date of clinic visit: 3/19/25  Reason for f/u and chief c/o: uncontrolled BP/HTN, h/o of IgA nephropathy and renal failure     HPI: Pt is a 47 y/o  female with a h/o of kidney biopsy proven IgA nephropathy who presents for f/u. She was last seen in renal clinic about 6 months ago. She has no c/o's today, no leg swelling, no blood in urine.     To review other issues, pt initially presented with JODI/renal failure and nephrotic syndrome in Nov 2020. Pt is s/p high dose prednisone treatment  (Nov 2020 to March 2021) with good response. Pt has also received ACE-I therapy, omega-3-fatty acids, and statins.          To review: Kidney biopsy showed about 25% chronic interstitial fibrosis. Importantly there was no sign of any crescents, however sample size was small.        PAST MEDICAL HISTORY:  IgA nephropathy, kidney biopsy proven 11/4/20 (on high dose prednisone Nov 2020 to March 2021, then slow taper until May 2021), Hypertension, hypercholestrolemia.     PAST SURGICAL HISTORY:  She  has no past surgical history on file.     SOCIAL HISTORY:  She  reports that she has never smoked. She does not have any smokeless tobacco history on file. She reports that she does not drink alcohol or use drugs.     FAMILY MEDICAL HISTORY:  Her family history is not on file.     Review of patient's allergies indicates:  No Known Allergies     Meds reviewed     Current Outpatient Medications   Medication Instructions    clobetasoL (TEMOVATE) 0.05 % external solution Topical (Top), Daily         hydroCHLOROthiazide 12.5 mg, Oral, Every other day    ketoconazole (NIZORAL) 2 % shampoo Topical (Top), Twice weekly    lisinopriL 10 mg, Oral, Daily    lovastatin (MEVACOR) 40 mg, Oral, Nightly    metoprolol tartrate (LOPRESSOR) 12.5 mg, Oral, 2 times daily    omega-3 fatty acids/fish oil (FISH OIL-OMEGA-3 FATTY ACIDS) 300-1,000 mg capsule 1 capsule, Daily        REVIEW OF SYSTEMS:  Patient has no fever, fatigue,  visual changes, chest pain, edema, cough, dyspnea, nausea, vomiting, constipation, diarrhea, arthralgias, pruritis, dizziness, weakness, depression, confusion.     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 have disappeared  No facial edema   Neck:   No JVD  Chest:  Clear with no rales, rhonchi, wheezing with normal effort  CV:      Regular with no murmurs, gallops or rubs  Abd:     Soft, nontender, no distension  Ext:      No edema     Labs reviewed   BMP  Lab Results   Component Value Date     03/12/2025    K 5.2 (H) 03/12/2025     03/12/2025    CO2 19 (L) 03/12/2025    BUN 57 (H) 03/12/2025    CREATININE 1.8 (H) 03/12/2025    CALCIUM 9.6 03/12/2025    ANIONGAP 12 03/12/2025    EGFRNORACEVR 33.7 (A) 03/12/2025     Lab Results   Component Value Date    WBC 4.44 03/12/2025    HGB 12.6 03/12/2025    HCT 39.6 03/12/2025    MCV 96 03/12/2025     03/12/2025     Lab Results   Component Value Date    .0 (H) 01/08/2021    CALCIUM 9.6 03/12/2025    PHOS 4.6 (H) 03/12/2025               U/a: no protein, 2+ blood   Urine prot/Cr 0.20 g, from 0.21 g, from 0.44 g, last visit 0.24 g, from 0.11 g, from 0.11 g, from 0.39 g, from 1.3 g from 2.4 g max        IMPRESSION AND RECOMMENDATIONS: 47 y/o  female with kidney biopsy proven IgA nephropathy presents for f/u:     1. Renal: s Cr stable, at baseline, though noted slow trend upwards  Stable renal function, CKD stage 3  H/o of kidney biopsy proven IgA nephropathy  Excellent prior response to treatment with high dose prednisone. JODI resolved and subnephrotic range proteinuria improved  Pt is clinically well and improved and stable.   Remains in remission. No sign of relapse  S/p high dose prednisone from Nov 2020 to March 2021, then slow taper until May 2021, prednisone (5 mg)  was stopped in Nov 2023  On statins, and good quality fish oil  On ACE-I's, has mild hyperkalemia  Borderline elevated diastolic BP     Has some residual renal damage, CKD stage 3  Proteinuria, in microalbuminuric range. No longer sub-nephrotic  Microscopic hematuria, has improved overall, stable     Side effects of prednisone: have resolved   Edema, easy bruising, HTN: all improved and resolved     Continue ACE-I, static, good quality fish oil  Will add jardiance (indicated in IgA nephropathy) 10 mg po qd. Was advised of side effects  Stop jardiance if yeats or bladder infection occurs  Advised on avoiding high K foods, eats bananas and tomatoes daily      2. HTN: as above  On lisinopril and HCTZ 12.5 mg po qod               Plans and recommendations:  As discussed above.  Total time spent 40 minutes including time needed to review the records, the   patient evaluation, documentation, face-to-face discussion with the patient,   more than 50% of the time was spent on coordination of care and counseling.    Level V visit.  RTC 3 months     Néstor Flores MD

## 2025-03-20 ENCOUNTER — TELEPHONE (OUTPATIENT)
Dept: NEPHROLOGY | Facility: CLINIC | Age: 50
End: 2025-03-20

## 2025-03-20 ENCOUNTER — PATIENT MESSAGE (OUTPATIENT)
Dept: NEPHROLOGY | Facility: CLINIC | Age: 50
End: 2025-03-20

## 2025-03-20 NOTE — TELEPHONE ENCOUNTER
----- Message from Néstor Flores MD sent at 3/19/2025  5:31 PM CDT -----  Regarding: RE: Call Back  Contact: patient  All medicines in that class are the same price. No generic available at present.  ----- Message -----  From: Analia Villegas LPN  Sent: 3/19/2025   4:55 PM CDT  To: Néstor Folres MD  Subject: FW: Call Back                                      ----- Message -----  From: Trixie Tavera  Sent: 3/19/2025   1:19 PM CDT  To: Sandra Palm Staff  Subject: Call Back                                        Type:  Patient Call BackWho Called: Vanessa Left Message for Patient: patient Does the patient know what this is regarding?: medication Would the patient rather a call back or a response via [facility]? Call back Best Call Back Number: [phone] Additional Information: new medication, cannot afford it or can she keep the same medication or reschedule the appointment.  The caller would like to have something cheaper if possible.Thanks,VARUN

## 2025-03-20 NOTE — TELEPHONE ENCOUNTER
Called pt to let her know what Dr Flores said about an alternative medication than Jardiance.  All medicines in that class are the same price. No generic available at present.No answer. 3/20/25/sf

## 2025-06-09 RX ORDER — LOVASTATIN 40 MG/1
40 TABLET ORAL NIGHTLY
Qty: 90 TABLET | Refills: 1 | Status: SHIPPED | OUTPATIENT
Start: 2025-06-09